# Patient Record
Sex: FEMALE | Race: WHITE | HISPANIC OR LATINO | Employment: FULL TIME | URBAN - METROPOLITAN AREA
[De-identification: names, ages, dates, MRNs, and addresses within clinical notes are randomized per-mention and may not be internally consistent; named-entity substitution may affect disease eponyms.]

---

## 2024-01-08 ENCOUNTER — APPOINTMENT (OUTPATIENT)
Dept: GENERAL RADIOLOGY | Facility: CLINIC | Age: 29
End: 2024-01-08
Attending: EMERGENCY MEDICINE
Payer: COMMERCIAL

## 2024-01-08 ENCOUNTER — HOSPITAL ENCOUNTER (EMERGENCY)
Facility: CLINIC | Age: 29
Discharge: HOME | End: 2024-01-08
Attending: EMERGENCY MEDICINE | Admitting: EMERGENCY MEDICINE
Payer: COMMERCIAL

## 2024-01-08 ENCOUNTER — APPOINTMENT (OUTPATIENT)
Dept: ULTRASOUND IMAGING | Facility: CLINIC | Age: 29
End: 2024-01-08
Attending: EMERGENCY MEDICINE
Payer: COMMERCIAL

## 2024-01-08 VITALS
HEART RATE: 94 BPM | WEIGHT: 160 LBS | OXYGEN SATURATION: 99 % | SYSTOLIC BLOOD PRESSURE: 103 MMHG | TEMPERATURE: 98.9 F | DIASTOLIC BLOOD PRESSURE: 65 MMHG | RESPIRATION RATE: 22 BRPM

## 2024-01-08 DIAGNOSIS — J40 BRONCHITIS: ICD-10-CM

## 2024-01-08 DIAGNOSIS — Z33.1 PREGNANT STATE, INCIDENTAL: ICD-10-CM

## 2024-01-08 LAB
ANION GAP SERPL CALCULATED.3IONS-SCNC: 13 MMOL/L (ref 7–15)
BASOPHILS # BLD AUTO: 0 10E3/UL (ref 0–0.2)
BASOPHILS NFR BLD AUTO: 0 %
BUN SERPL-MCNC: 6.1 MG/DL (ref 6–20)
CALCIUM SERPL-MCNC: 9.7 MG/DL (ref 8.6–10)
CHLORIDE SERPL-SCNC: 99 MMOL/L (ref 98–107)
CREAT SERPL-MCNC: 0.49 MG/DL (ref 0.51–0.95)
D DIMER PPP FEU-MCNC: 0.88 UG/ML FEU (ref 0–0.5)
DEPRECATED HCO3 PLAS-SCNC: 22 MMOL/L (ref 22–29)
EGFRCR SERPLBLD CKD-EPI 2021: >90 ML/MIN/1.73M2
EOSINOPHIL # BLD AUTO: 0.2 10E3/UL (ref 0–0.7)
EOSINOPHIL NFR BLD AUTO: 2 %
ERYTHROCYTE [DISTWIDTH] IN BLOOD BY AUTOMATED COUNT: 12.6 % (ref 10–15)
FLUAV RNA SPEC QL NAA+PROBE: NEGATIVE
FLUBV RNA RESP QL NAA+PROBE: NEGATIVE
GLUCOSE SERPL-MCNC: 101 MG/DL (ref 70–99)
HCT VFR BLD AUTO: 38.6 % (ref 35–47)
HGB BLD-MCNC: 13 G/DL (ref 11.7–15.7)
HOLD SPECIMEN: NORMAL
HOLD SPECIMEN: NORMAL
IMM GRANULOCYTES # BLD: 0.1 10E3/UL
IMM GRANULOCYTES NFR BLD: 1 %
INR PPP: 0.99 (ref 0.85–1.15)
LYMPHOCYTES # BLD AUTO: 1.9 10E3/UL (ref 0.8–5.3)
LYMPHOCYTES NFR BLD AUTO: 19 %
MCH RBC QN AUTO: 29.7 PG (ref 26.5–33)
MCHC RBC AUTO-ENTMCNC: 33.7 G/DL (ref 31.5–36.5)
MCV RBC AUTO: 88 FL (ref 78–100)
MONOCYTES # BLD AUTO: 0.7 10E3/UL (ref 0–1.3)
MONOCYTES NFR BLD AUTO: 7 %
NEUTROPHILS # BLD AUTO: 7.2 10E3/UL (ref 1.6–8.3)
NEUTROPHILS NFR BLD AUTO: 71 %
NRBC # BLD AUTO: 0 10E3/UL
NRBC BLD AUTO-RTO: 0 /100
PLATELET # BLD AUTO: 265 10E3/UL (ref 150–450)
POTASSIUM SERPL-SCNC: 3.9 MMOL/L (ref 3.4–5.3)
RBC # BLD AUTO: 4.38 10E6/UL (ref 3.8–5.2)
RSV RNA SPEC NAA+PROBE: NEGATIVE
SARS-COV-2 RNA RESP QL NAA+PROBE: NEGATIVE
SODIUM SERPL-SCNC: 134 MMOL/L (ref 135–145)
TROPONIN T SERPL HS-MCNC: <6 NG/L
WBC # BLD AUTO: 10 10E3/UL (ref 4–11)

## 2024-01-08 PROCEDURE — 71046 X-RAY EXAM CHEST 2 VIEWS: CPT

## 2024-01-08 PROCEDURE — 85025 COMPLETE CBC W/AUTO DIFF WBC: CPT | Performed by: EMERGENCY MEDICINE

## 2024-01-08 PROCEDURE — 85379 FIBRIN DEGRADATION QUANT: CPT | Performed by: EMERGENCY MEDICINE

## 2024-01-08 PROCEDURE — 87637 SARSCOV2&INF A&B&RSV AMP PRB: CPT | Performed by: EMERGENCY MEDICINE

## 2024-01-08 PROCEDURE — 85610 PROTHROMBIN TIME: CPT | Performed by: EMERGENCY MEDICINE

## 2024-01-08 PROCEDURE — 93005 ELECTROCARDIOGRAM TRACING: CPT

## 2024-01-08 PROCEDURE — 36415 COLL VENOUS BLD VENIPUNCTURE: CPT | Performed by: EMERGENCY MEDICINE

## 2024-01-08 PROCEDURE — 80048 BASIC METABOLIC PNL TOTAL CA: CPT | Performed by: EMERGENCY MEDICINE

## 2024-01-08 PROCEDURE — 94640 AIRWAY INHALATION TREATMENT: CPT

## 2024-01-08 PROCEDURE — 96361 HYDRATE IV INFUSION ADD-ON: CPT

## 2024-01-08 PROCEDURE — 84484 ASSAY OF TROPONIN QUANT: CPT | Performed by: EMERGENCY MEDICINE

## 2024-01-08 PROCEDURE — 99285 EMERGENCY DEPT VISIT HI MDM: CPT | Mod: 25

## 2024-01-08 PROCEDURE — 250N000013 HC RX MED GY IP 250 OP 250 PS 637: Performed by: EMERGENCY MEDICINE

## 2024-01-08 PROCEDURE — 76801 OB US < 14 WKS SINGLE FETUS: CPT

## 2024-01-08 PROCEDURE — 96360 HYDRATION IV INFUSION INIT: CPT

## 2024-01-08 PROCEDURE — 258N000003 HC RX IP 258 OP 636: Performed by: EMERGENCY MEDICINE

## 2024-01-08 PROCEDURE — 250N000009 HC RX 250: Performed by: EMERGENCY MEDICINE

## 2024-01-08 PROCEDURE — 93970 EXTREMITY STUDY: CPT

## 2024-01-08 RX ORDER — IPRATROPIUM BROMIDE AND ALBUTEROL SULFATE 2.5; .5 MG/3ML; MG/3ML
3 SOLUTION RESPIRATORY (INHALATION) ONCE
Status: COMPLETED | OUTPATIENT
Start: 2024-01-08 | End: 2024-01-08

## 2024-01-08 RX ORDER — ALBUTEROL SULFATE 90 UG/1
2 AEROSOL, METERED RESPIRATORY (INHALATION) ONCE
Status: COMPLETED | OUTPATIENT
Start: 2024-01-08 | End: 2024-01-08

## 2024-01-08 RX ADMIN — ALBUTEROL SULFATE 2 PUFF: 90 AEROSOL, METERED RESPIRATORY (INHALATION) at 19:28

## 2024-01-08 RX ADMIN — SODIUM CHLORIDE 1000 ML: 9 INJECTION, SOLUTION INTRAVENOUS at 14:53

## 2024-01-08 RX ADMIN — IPRATROPIUM BROMIDE AND ALBUTEROL SULFATE 3 ML: .5; 3 SOLUTION RESPIRATORY (INHALATION) at 15:26

## 2024-01-08 ASSESSMENT — ACTIVITIES OF DAILY LIVING (ADL)
ADLS_ACUITY_SCORE: 35

## 2024-01-08 NOTE — ED PROVIDER NOTES
History     Chief Complaint:  Cold Symptoms     The history is provided by the patient.      Sridevi Self is a 28 year old female 14w0d primigravida who presents with her  for evaluation of cold symptoms. Sridevi reports she developed a cough 4 mornings ago that worsened throughout that day and the next. She endorses a deep and painful cough, rhinitis, rhinorrhea, and shortness of breath. She presented to Urgent Care 2 days ago with a negative Covid test, though she notes her symptoms are consistent with her previous Covid infection in October 2022. She denies fevers, nausea, vomiting, or bowel, bladder, or vaginal symptoms. No pulmonary, clotting, or cardiac history. Sridevi notes [place] and believes she may have been exposed to an illness despite wearing a mask regularly.     Independent Historian:   None - Patient Only    Review of External Notes:   I reviewed her Urgent Care note form 1/6/24 for evaluation of a cough. Covid molecular testing was negative.    Medications:    Magnesium oxide  Riboflavin  Prenatal    Past Medical History:    ANA PAULA  Migraine  Nonalcoholic fatty liver disease  Dysmenorrhea  Hyperlipidemia  MRSA    Past Surgical History:    [place] teeth extraction  Root canal therapy x2    Physical Exam   Patient Vitals for the past 24 hrs:   BP Temp Temp src Pulse Resp SpO2 Weight   01/08/24 2034 103/65 -- -- 94 -- 99 % --   01/08/24 2004 104/64 -- -- 90 -- 99 % --   01/08/24 1806 -- -- -- 92 -- 94 % --   01/08/24 1805 -- -- -- 98 -- 97 % --   01/08/24 1804 -- -- -- -- -- 95 % --   01/08/24 1803 -- -- -- 111 -- 95 % --   01/08/24 1802 -- -- -- 93 -- 95 % --   01/08/24 1801 -- -- -- 93 -- 95 % --   01/08/24 1800 95/61 -- -- 90 -- 95 % --   01/08/24 1600 -- -- -- 106 -- 90 % --   01/08/24 1559 125/84 -- -- 106 -- 96 % --   01/08/24 1542 -- -- -- 116 -- 99 % --   01/08/24 1532 -- -- -- 106 -- 94 % --   01/08/24 1525 116/76 98.9  F (37.2  C) Temporal 105 22  97 % 72.6 kg (160 lb)   01/08/24 1453 118/81 -- -- 114 -- 95 % --   01/08/24 1445 118/81 -- -- (!) 121 21 95 % --   01/08/24 1436 112/83 -- -- (!) 125 22 97 % --   01/08/24 1428 (!) 136/92 97.7  F (36.5  C) Temporal (!) 152 24 96 % --      Physical Exam  General:              Well-nourished              Speaking in full sentences  Eyes:              Conjunctiva without injection or scleral icterus  ENT:              Moist mucous membranes              Nares patent              Pinnae normal  Neck:              Full ROM              No stiffness appreciated  Resp:              Coarseness to right mid-lung field              Left lung field  CV:                    Tachycardic rate, regular rhythm              S1 and S2 present              No murmur, gallop or rub  GI:              BS present              Abdomen soft without distention              Non-tender to light and deep palpation              No guarding or rebound tenderness  Skin:              Warm, dry, well perfused              No rashes or open wounds on exposed skin  MSK:              Moves all extremities              No focal deformities or swelling  Neuro:              Alert              Answers questions appropriately              Moves all extremities equally              Gait stable  Psych:              Normal affect, normal mood    Emergency Department Course   ECG  ECG taken at 1458, ECG read at 1504  Sinus tachycardia  Otherwise normal ECG   Rate 114 bpm. HI interval 126 ms. QRS duration 66 ms. QT/QTc 304/419 ms. P-R-T axes 63 30 15.     Imaging:  US Lower Extremity Venous Duplex Bilateral   Final Result   IMPRESSION:   1.  No deep venous thrombosis in the bilateral lower extremities.      Chest XR,  PA & LAT   Final Result   IMPRESSION: No acute cardiopulmonary disease.      EDUAR PHILLIP MD            SYSTEM ID:  [id number]      POC US OB TRANSABDOMINAL LIMITED   Final Result     Limited Obstetric Ultrasound      Procedure Note: POC Ultrasound  Limited Obstetric Exam      (This is a limited bedside US which does not assess fetal health and is not a formal OB ultrasound)       Indication: Cough, pregnant state      Views: Suprapubic transverse and longitudinal       Findings: Intrauterine pregnancy and Fetal cardiac activity      Impression: Intrauterine pregnancy  and Fetal cardiac activity present       Study performed by: Axel Davidson MD       Images archived: Yes            Report per radiology    Laboratory:  Labs Ordered and Resulted from Time of ED Arrival to Time of ED Departure   BASIC METABOLIC PANEL - Abnormal       Result Value    Sodium 134 (*)     Potassium 3.9      Chloride 99      Carbon Dioxide (CO2) 22      Anion Gap 13      Urea Nitrogen 6.1      Creatinine 0.49 (*)     GFR Estimate >90      Calcium 9.7      Glucose 101 (*)    D DIMER QUANTITATIVE - Abnormal    D-Dimer Quantitative 0.88 (*)    INFLUENZA A/B, RSV, & SARS-COV2 PCR - Normal    Influenza A PCR Negative      Influenza B PCR Negative      RSV PCR Negative      SARS CoV2 PCR Negative     TROPONIN T, HIGH SENSITIVITY - Normal    Troponin T, High Sensitivity <6     INR - Normal    INR 0.99     CBC WITH PLATELETS AND DIFFERENTIAL    WBC Count 10.0      RBC Count 4.38      Hemoglobin 13.0      Hematocrit 38.6      MCV 88      MCH 29.7      MCHC 33.7      RDW 12.6      Platelet Count 265      % Neutrophils 71      % Lymphocytes 19      % Monocytes 7      % Eosinophils 2      % Basophils 0      % Immature Granulocytes 1      NRBCs per 100 WBC 0      Absolute Neutrophils 7.2      Absolute Lymphocytes 1.9      Absolute Monocytes 0.7      Absolute Eosinophils 0.2      Absolute Basophils 0.0      Absolute Immature Granulocytes 0.1      Absolute NRBCs 0.0       Procedures   None    Emergency Department Course & Assessments:    Interventions:  Medications   ipratropium - albuterol 0.5 mg/2.5 mg/3 mL (DUONEB) neb solution 3 mL (3 mLs Nebulization $Given 1/8/24 2439)   sodium chloride  0.9% BOLUS 1,000 mL (0 mLs Intravenous Stopped 24 180)   albuterol (PROVENTIL HFA/VENTOLIN HFA) inhaler (2 puffs Inhalation $Given 24)     Assessments:  1438 I obtained history and examined the patient as noted above.  1458 I performed a bedside US.  1540 I rechecked the patient and explained findings.    I rechecked the patient.   I rechecked the patient and explained findings. Discussed risks/benefits of further imaging with CT, and patient preferring discharge and close monitoring.  Patient discharged home with instructions regarding supportive care, medications, and reasons to return. The importance of close follow-up was reviewed.     Independent Interpretation (X-rays, CTs, rhythm strip):  I reviewed her chest XR, which shows no acute infiltrate.    Consultations/Discussion of Management or Tests:  None    Social Determinants of Health affecting care:   None    Disposition:  The patient was discharged to home.     Impression & Plan    CMS Diagnoses: None    Medical Decision Making:  Sridevi Self is a 28 yr old  at 14 wk EGA presenting to the ER for evaluation of cold symptoms.  VS on presentation notable for tachycardia with HR of 152, which improved and ultimately normalized during her ED course.  Based on the above history and evaluation, high suspicion for bronchitis given symptom complex of nasal congestion, rhinorrhea, and cough.  During her ED course, she was noted to have intermittent episodes and bouts of frequent coughing.  Using shared decision-making, we did perform a chest x-ray, which was negative for acute infiltrate.  Viral testing returned negative for COVID, influenza, or RSV.  She initially had coarseness to the right mid lung field for which a nebulizer treatment was administered.  On reassessment, this had resolved, indicating a possible component of bronchospasm.  She noted feeling somewhat jittery, likely secondary to the beta agonist effect.  She was  additionally provided IV fluids, and heart rate improved in conjunction with this.  Per chart review, patient has been tachycardic at many of her recent prior visits, suggesting this may be near her baseline.  EKG was obtained demonstrating sinus tachycardia, though without evidence of prolonged QT interval, WPW, or Brugada syndrome.  We had a long and thorough discussion regarding the above clinical impression as well as broad differential diagnosis.  Given duration of symptoms (4 days), do not feel patient requires systemic antibiotic therapy, but we otherwise discussed supportive cares including rest, fluids, acetaminophen as needed and close monitoring of symptoms.  We also discussed at length consideration of pulmonary embolism as patient does acknowledge a component of shortness of breath, and presented with tachycardia.  She does have risk factors including current pregnancy as well as antecedent travel a few months previous.  We discussed my low pretest probability given her above symptom complex, the patient wishing to proceed with further restratification including D-dimer.  This did return elevated at 0.88.  As such, duplex ultrasound was subsequently performed of the bilateral lower extremities, fortunately revealing no evidence of DVT.  We had a long and thorough discussion regarding risks/benefits of further evaluation for pulmonary embolism, which does involve CT of the chest.  We discussed risks including radiation exposure both the patient as well as to the developing fetus, also acknowledging the early gestational age.  We also discussed YEARS criteria, which given current D-dimer value, strongly argues against PE.  Ultimately, patient is electing to forego CT imaging at this time, which I do feel to be reasonable given the above workup and very low pretest probability given her above symptom complex.  We discussed need to monitor symptoms closely and ongoing supportive cares.  I recommended close  follow-up with primary care provider in 2 days for recheck.  She is to return to the ER in the meantime with any new or troubling symptoms including worsened shortness of breath, worsening chest pain, syncope or any other concerns.  We did perform a bedside ultrasound, confirming IUP with positive fetal heart rate.  Patient and spouse both feel very comfortable with outlined plan of care.  All other questions have been answered prior to discharge.    Diagnosis:    ICD-10-CM    1. Bronchitis  J40       2. Pregnant state, incidental  Z33.1           Scribe Disclosure:  Tim MARCOS Hailie, am serving as a scribe at 7:13 PM on 1/8/2024 to document services personally performed by Axel Davidson MD based on my observations and the provider's statements to me.   1/8/2024   Axel Davidson MD Roach, Brian Donald, MD  01/08/24 [id number]

## 2024-01-08 NOTE — ED TRIAGE NOTES
Cold symptoms x 2 days. In triage, heart rate is 152. Patient is short of breath with activity. 14 weeks pregnant.

## 2024-01-09 LAB
ATRIAL RATE - MUSE: 114 BPM
DIASTOLIC BLOOD PRESSURE - MUSE: NORMAL MMHG
INTERPRETATION ECG - MUSE: NORMAL
P AXIS - MUSE: 63 DEGREES
PR INTERVAL - MUSE: 126 MS
QRS DURATION - MUSE: 66 MS
QT - MUSE: 304 MS
QTC - MUSE: 419 MS
R AXIS - MUSE: 30 DEGREES
SYSTOLIC BLOOD PRESSURE - MUSE: NORMAL MMHG
T AXIS - MUSE: 15 DEGREES
VENTRICULAR RATE- MUSE: 114 BPM

## 2024-01-09 NOTE — DISCHARGE INSTRUCTIONS
Please monitor symptoms closely.  Rest, fluids, and symptoms should improve with supportive cares.    Follow-up with primary care provider in 1 to 2 days for recheck.    Return to ER with any new or troubling symptoms such as worsening cough, shortness of breath, chest pain or any other concerns.    Discharge Instructions  Bronchitis, Pneumonia, Bronchospasm    You were seen today for a chest infection or inflammation. If your provider decided this was due to a bacterial infection, you may need an antibiotic. Sometimes these are caused by a virus, and then an antibiotic will not help.     Generally, every Emergency Department visit should have a follow-up clinic visit with either a primary or a specialty clinic/provider. Please follow-up as instructed by your emergency provider today.    Return to the Emergency Department if:  Your breathing gets much worse.  You are very weak, or feel much more ill.  You develop new symptoms, such as chest pain.  You cough up blood.  You are vomiting (throwing up) enough that you cannot keep fluids or your medicine down.    What can I do to help myself?  Fill any prescriptions the provider gave you and take them right away--especially antibiotics. Be sure to finish the whole antibiotic prescription.  You may be given a prescription for an inhaler, which can help loosen tight air passages.  Use this as needed, but not more often than directed. Inhalers work much better when used with a spacer.   You may be given a prescription for a steroid to reduce inflammation. Used long-term, these can have side effects, but for short-term use they are safe. You may notice restlessness or increased appetite.      You may use non-prescription cough or cold medicines. Cough medicines may help, but don t make the cough go away completely.   Avoid smoke, because this can make your symptoms worse. If you smoke, this may be a good time to quit! Consider using nicotine lozenges, gum, or patches to  reduce cravings.   If you have a fever, Tylenol  (acetaminophen), Motrin  (ibuprofen), or Advil  (ibuprofen) may help bring fever down and may help you feel more comfortable. Be sure to read and follow the package directions, and ask your provider if you have questions.  Be sure to get your flu shot each year.  For certain ages, the pneumonia shot can help prevent pneumonia.  If you were given a prescription for medicine here today, be sure to read all of the information (including the package insert) that comes with your prescription.  This will include important information about the medicine, its side effects, and any warnings that you need to know about.  The pharmacist who fills the prescription can provide more information and answer questions you may have about the medicine.  If you have questions or concerns that the pharmacist cannot address, please call or return to the Emergency Department.     Remember that you can always come back to the Emergency Department if you are not able to see your regular provider in the amount of time listed above, if you get any new symptoms, or if there is anything that worries you.

## 2024-03-10 ENCOUNTER — HOSPITAL ENCOUNTER (OUTPATIENT)
Facility: CLINIC | Age: 29
Discharge: HOME | End: 2024-03-10
Attending: OBSTETRICS & GYNECOLOGY | Admitting: OBSTETRICS & GYNECOLOGY
Payer: COMMERCIAL

## 2024-03-10 ENCOUNTER — HOSPITAL ENCOUNTER (OUTPATIENT)
Facility: CLINIC | Age: 29
End: 2024-03-10
Admitting: OBSTETRICS & GYNECOLOGY
Payer: COMMERCIAL

## 2024-03-10 VITALS
SYSTOLIC BLOOD PRESSURE: 108 MMHG | HEIGHT: 61 IN | DIASTOLIC BLOOD PRESSURE: 58 MMHG | TEMPERATURE: 98 F | RESPIRATION RATE: 18 BRPM | WEIGHT: 176 LBS | HEART RATE: 96 BPM | BODY MASS INDEX: 33.23 KG/M2

## 2024-03-10 PROBLEM — Z36.89 ENCOUNTER FOR TRIAGE IN PREGNANT PATIENT: Status: ACTIVE | Noted: 2024-03-10

## 2024-03-10 LAB
ALBUMIN UR-MCNC: NEGATIVE MG/DL
APPEARANCE UR: CLEAR
BACTERIA #/AREA URNS HPF: ABNORMAL /HPF
BILIRUB UR QL STRIP: NEGATIVE
COLOR UR AUTO: ABNORMAL
GLUCOSE UR STRIP-MCNC: NEGATIVE MG/DL
HGB UR QL STRIP: NEGATIVE
KETONES UR STRIP-MCNC: NEGATIVE MG/DL
LEUKOCYTE ESTERASE UR QL STRIP: NEGATIVE
NITRATE UR QL: NEGATIVE
PH UR STRIP: 6.5 [PH] (ref 5–7)
RBC URINE: 0 /HPF
SP GR UR STRIP: 1 (ref 1–1.03)
SQUAMOUS EPITHELIAL: <1 /HPF
UROBILINOGEN UR STRIP-MCNC: NORMAL MG/DL
WBC URINE: <1 /HPF

## 2024-03-10 PROCEDURE — G0463 HOSPITAL OUTPT CLINIC VISIT: HCPCS

## 2024-03-10 PROCEDURE — 81001 URINALYSIS AUTO W/SCOPE: CPT | Performed by: OBSTETRICS & GYNECOLOGY

## 2024-03-10 RX ORDER — FLUTICASONE PROPIONATE 50 MCG
1 SPRAY, SUSPENSION (ML) NASAL DAILY
Status: ON HOLD | COMMUNITY
End: 2024-07-15

## 2024-03-10 RX ORDER — LIDOCAINE 40 MG/G
CREAM TOPICAL
Status: DISCONTINUED | OUTPATIENT
Start: 2024-03-10 | End: 2024-03-10 | Stop reason: HOSPADM

## 2024-03-10 RX ORDER — BACLOFEN 20 MG
500 TABLET ORAL DAILY
COMMUNITY

## 2024-03-10 ASSESSMENT — ACTIVITIES OF DAILY LIVING (ADL)
ADLS_ACUITY_SCORE: 35
ADLS_ACUITY_SCORE: 18

## 2024-03-10 NOTE — PLAN OF CARE
Data: Patient presented to [facility]: 3/10/2024 11:59 AM.  Reason for maternal/fetal assessment is abdominal pain. Patient reports achy left lower abdominal pain and a sharp pain in their right lower quadrant.  Patient is a .  Prenatal record reviewed. Pregnancy has been uncomplicated..  Gestational Age Unknown. VSS. Fetal movement active. Patient denies uterine contractions, leaking of vaginal fluid/rupture of membranes, vaginal bleeding, pelvic pressure, nausea, vomiting, headache, visual disturbances, epigastric or URQ pain, significant edema. Support person is present.   Action: Verbal consent for EFM. Triage assessment completed. Bill of rights reviewed.  Response: Patient verbalized agreement with plan. Will contact Dr Stacey Herbert with update and for further orders.

## 2024-03-10 NOTE — PROVIDER NOTIFICATION
03/10/24 1241   Provider Notification   Provider Name/Title Piedad   Method of Notification Electronic Page   Request Evaluate - Remote   Notification Reason Patient Arrived;Pain     Triage patient in [facility] 1 S.M. , 22w6d, no significant medical hx. Here because she was holding her friends baby and tried to get up while holding the baby and pulled a ligament. Complaining of 4/10 aching pain in her left lower abdomen and a sharp pain in her right lower quadrant. Has not taken any medication for the pain. Dop tones found, basline 150. No contractions seen on monitor. No complaints of bleeding, contractions, ruptured membranes. Vitals and assessment WNL. No other complaints.  
   03/10/24 1339   Provider Notification   Provider Name/Title Piedad   Method of Notification Electronic Page   Request Evaluate - Remote   Notification Reason Lab/Diagnostic Study     UA results negative.  
   03/10/24 3025   Provider Notification   Provider Name/Title Dr. Herbert   Method of Notification Electronic Page   Request Evaluate - Remote   Notification Reason Other (Comment)     MD messaged again to ask if they wanted to run any other tests on this patient.    Verbal orders received to get a UA on patient and if normal she can go home. Educate patient that tylenol is safe during pregnancy.  
36.8

## 2024-03-10 NOTE — PLAN OF CARE
Data: Patient assessed in the [facility] for abdominal pain.  Cervical exam not examined.  Membranes intact.  Contractions/uterine assessment  no contractions.  Action:  Presumed adequate fetal oxygenation documented (see flow record). Discharge instructions reviewed.  Patient instructed to report change in fetal movement, vaginal leaking of fluid or bleeding, abdominal pain, or any concerns related to the pregnancy to her nurse/physician.    Response: Orders to discharge home per Stacey Herbert.  Patient verbalized understanding of education and verbalized agreement with plan. Discharged to home at 1350.

## 2024-05-12 ENCOUNTER — HEALTH MAINTENANCE LETTER (OUTPATIENT)
Age: 29
End: 2024-05-12

## 2024-07-01 ENCOUNTER — HOSPITAL ENCOUNTER (OUTPATIENT)
Facility: CLINIC | Age: 29
Discharge: HOME | End: 2024-07-01
Attending: OBSTETRICS & GYNECOLOGY | Admitting: OBSTETRICS & GYNECOLOGY
Payer: COMMERCIAL

## 2024-07-01 VITALS
OXYGEN SATURATION: 100 % | DIASTOLIC BLOOD PRESSURE: 69 MMHG | TEMPERATURE: 98.4 F | RESPIRATION RATE: 18 BRPM | SYSTOLIC BLOOD PRESSURE: 113 MMHG

## 2024-07-01 DIAGNOSIS — O23.599 BACTERIAL VAGINOSIS IN PREGNANCY: Primary | ICD-10-CM

## 2024-07-01 DIAGNOSIS — B96.89 BACTERIAL VAGINOSIS IN PREGNANCY: Primary | ICD-10-CM

## 2024-07-01 LAB
ALBUMIN UR-MCNC: NEGATIVE MG/DL
APPEARANCE UR: CLEAR
BACTERIA #/AREA URNS HPF: ABNORMAL /HPF
BILIRUB UR QL STRIP: NEGATIVE
CLUE CELLS: PRESENT
COLOR UR AUTO: ABNORMAL
CRYSTALS AMN MICRO: NORMAL
GLUCOSE UR STRIP-MCNC: NEGATIVE MG/DL
HGB UR QL STRIP: NEGATIVE
KETONES UR STRIP-MCNC: NEGATIVE MG/DL
LEUKOCYTE ESTERASE UR QL STRIP: NEGATIVE
MUCOUS THREADS #/AREA URNS LPF: PRESENT /LPF
NITRATE UR QL: NEGATIVE
PH UR STRIP: 6.5 [PH] (ref 5–7)
RBC URINE: <1 /HPF
SP GR UR STRIP: 1.01 (ref 1–1.03)
SQUAMOUS EPITHELIAL: 1 /HPF
TRICHOMONAS, WET PREP: ABNORMAL
UROBILINOGEN UR STRIP-MCNC: NORMAL MG/DL
WBC URINE: 2 /HPF
WBC'S/HIGH POWER FIELD, WET PREP: ABNORMAL
YEAST, WET PREP: ABNORMAL

## 2024-07-01 PROCEDURE — G0463 HOSPITAL OUTPT CLINIC VISIT: HCPCS

## 2024-07-01 PROCEDURE — 87210 SMEAR WET MOUNT SALINE/INK: CPT | Performed by: OBSTETRICS & GYNECOLOGY

## 2024-07-01 PROCEDURE — 81003 URINALYSIS AUTO W/O SCOPE: CPT | Performed by: OBSTETRICS & GYNECOLOGY

## 2024-07-01 RX ORDER — METRONIDAZOLE 500 MG/1
500 TABLET ORAL 2 TIMES DAILY
Qty: 14 TABLET | Refills: 0 | Status: SHIPPED | OUTPATIENT
Start: 2024-07-01 | End: 2024-07-08

## 2024-07-01 RX ORDER — LIDOCAINE 40 MG/G
CREAM TOPICAL
Status: DISCONTINUED | OUTPATIENT
Start: 2024-07-01 | End: 2024-07-01 | Stop reason: HOSPADM

## 2024-07-01 ASSESSMENT — ACTIVITIES OF DAILY LIVING (ADL)
ADLS_ACUITY_SCORE: 35

## 2024-07-01 NOTE — PROVIDER NOTIFICATION
.Data: Patient assessed in the [facility] for leaking vaginal fluid.  Cervical exam not examined but visualized cervix with speculum, cervix closed.  Membranes intact.  Contractions/uterine assessment present, 2-6 mins palpating mild pt unaware of contractions.  Action:  Presumed adequate fetal oxygenation documented (see flow record). Discharge instructions reviewed.  Patient instructed to report change in fetal movement, vaginal leaking of fluid or bleeding, abdominal pain, or any concerns related to the pregnancy to her nurse/physician.    Response: Orders to discharge home per Christal Hobson.  Patient verbalized understanding of education and verbalized agreement with plan. Discharged to home at 0330.

## 2024-07-01 NOTE — DISCHARGE INSTRUCTIONS
Learning About When to Call Your Doctor During Pregnancy (After 20 Weeks)  Overview  It's common to have concerns about what might be a problem when you're pregnant. Most pregnancies don't have any serious problems. But it's still important to know when to call your doctor if you have certain symptoms or signs of labor.  These are general suggestions. Your doctor may give you some more information about when to call.  When to call your doctor (after 20 weeks)  Call 911  anytime you think you may need emergency care. For example, call if:  You have severe vaginal bleeding. This means you are soaking through a pad each hour for 2 or more hours.  You have sudden, severe pain in your belly.  You have chest pain, are short of breath, or cough up blood.  You passed out (lost consciousness).  You have a seizure.  You see or feel the umbilical cord.  You think you are about to deliver your baby and can't make it safely to the hospital or birthing center.  Call your doctor now or seek immediate medical care if:  You have vaginal bleeding.  You have belly pain.  You have a fever.  You are dizzy or lightheaded, or you feel like you may faint.  You have signs of a blood clot in your leg (called a deep vein thrombosis), such as:  Pain in the calf, back of the knee, thigh, or groin.  Swelling in your leg or groin.  A color change on the leg or groin. The skin may be reddish or purplish, depending on your usual skin color.  You have symptoms of preeclampsia, such as:  Sudden swelling of your face, hands, or feet.  New vision problems (such as dimness, blurring, or seeing spots).  A severe headache.  You have a sudden release of fluid from your vagina. (You think your water broke.)  You've been having regular contractions for an hour. This means that you've had at least 6 contractions within 1 hour, even after you change your position and drink fluids.  You notice that your baby has stopped moving or is moving less than  "normal.  You have signs of heart failure, such as:  New or increased shortness of breath.  New or worse swelling in your legs, ankles, or feet.  Sudden weight gain, such as more than 2 to 3 pounds in a day or 5 pounds in a week.  Feeling so tired or weak that you cannot do your usual activities.  You have symptoms of a urinary tract infection. These may include:  Pain or burning when you urinate.  A frequent need to urinate without being able to pass much urine.  Pain in the flank, which is just below the rib cage and above the waist on either side of the back.  Blood in your urine.  Watch closely for changes in your health, and be sure to contact your doctor if:  You have vaginal discharge that smells bad.  You feel sad, anxious, or hopeless for more than a few days.  You have skin changes, such as a rash, itching, or a yellow color to your skin.  You have other concerns about your pregnancy.  If you have labor signs at 37 weeks or more  If you have signs of labor at 37 weeks or more, your doctor may tell you to call when your labor becomes more active. Symptoms of active labor include:  Contractions that are regular.  Contractions that are less than 5 minutes apart.  Contractions that are hard to talk through.  Follow-up care is a key part of your treatment and safety. Be sure to make and go to all appointments, and call your doctor if you are having problems. It's also a good idea to know your test results and keep a list of the medicines you take.  Where can you learn more?  Go to https://www.[employer].net/patiented  Enter N531 in the search box to learn more about \"Learning About When to Call Your Doctor During Pregnancy (After 20 Weeks).\"  Current as of: July 10, 2023               Content Version: 14.0    2665-9606 [employer].   Care instructions adapted under license by your healthcare professional. If you have questions about a medical condition or this instruction, always ask your healthcare " professional. [employer] disclaims any warranty or liability for your use of this information.

## 2024-07-01 NOTE — PROVIDER NOTIFICATION
24 0127   Provider Notification   Provider Name/Title Dr. Hobson   Method of Notification Phone   Request Evaluate - Remote   Notification Reason Patient Arrived     Paged MD to inform of pt arrival. Pt is a  39w GBS- uncomplicated pregnancy. Pt noticed yesterday at 1630 she had a constant wet feeling and thought her water broke and has been a slow leak. Pt states there has been no fluid on her underwear but appears clear when wiping with toilet paper. Pt denies VB, contractions. Pt has had more of an urgency to urinate otherwise no other urinary symptoms. RN had pt pool, no fluid seen on the pad. Pt feeling fetal movement, FHR tracing minimal, 1 accel. In the last 10 mins x2 contractions palpating mild and pt unaware of contractions.   MD gave orders to collect fern, wet prep, UA and check cervix if indicated.

## 2024-07-01 NOTE — PROVIDER NOTIFICATION
07/01/24 0319   Provider Notification   Provider Name/Title    Method of Notification Phone   Request Evaluate - Remote   Notification Reason Lab/Diagnostic Study     Paged MD to review labs, UA normal, no ferning, positive for clue cells. MD put in an order to pt pharmacy for flagyl to be picked up in the morning. MD gave discharge orders. FHR tracing category 1 with accels, moderate variability. Pt unaware of contractins, palpating mild. Reviewed education with pt, and discharge instructions. Pt agreeable with poc.

## 2024-07-12 ENCOUNTER — HOSPITAL ENCOUNTER (INPATIENT)
Facility: CLINIC | Age: 29
LOS: 5 days | Discharge: HOME HEALTH | End: 2024-07-17
Attending: OBSTETRICS & GYNECOLOGY | Admitting: OBSTETRICS & GYNECOLOGY
Payer: COMMERCIAL

## 2024-07-12 DIAGNOSIS — E66.812 CLASS 2 OBESITY: ICD-10-CM

## 2024-07-12 DIAGNOSIS — K76.0 FATTY LIVER: ICD-10-CM

## 2024-07-12 DIAGNOSIS — Z36.89 ENCOUNTER FOR TRIAGE IN PREGNANT PATIENT: ICD-10-CM

## 2024-07-12 DIAGNOSIS — Z98.891 S/P C-SECTION: Primary | ICD-10-CM

## 2024-07-12 LAB
ABO/RH(D): NORMAL
ALBUMIN SERPL BCG-MCNC: 3.3 G/DL (ref 3.5–5.2)
ALP SERPL-CCNC: 212 U/L (ref 40–150)
ALT SERPL W P-5'-P-CCNC: 14 U/L (ref 0–50)
ANION GAP SERPL CALCULATED.3IONS-SCNC: 12 MMOL/L (ref 7–15)
ANTIBODY SCREEN: NEGATIVE
AST SERPL W P-5'-P-CCNC: 15 U/L (ref 0–45)
BASOPHILS # BLD AUTO: 0 10E3/UL (ref 0–0.2)
BASOPHILS NFR BLD AUTO: 0 %
BILIRUB SERPL-MCNC: 0.2 MG/DL
BUN SERPL-MCNC: 8 MG/DL (ref 6–20)
CALCIUM SERPL-MCNC: 8.7 MG/DL (ref 8.6–10)
CHLORIDE SERPL-SCNC: 102 MMOL/L (ref 98–107)
CREAT SERPL-MCNC: 0.56 MG/DL (ref 0.51–0.95)
DEPRECATED HCO3 PLAS-SCNC: 22 MMOL/L (ref 22–29)
EGFRCR SERPLBLD CKD-EPI 2021: >90 ML/MIN/1.73M2
EOSINOPHIL # BLD AUTO: 0.1 10E3/UL (ref 0–0.7)
EOSINOPHIL NFR BLD AUTO: 1 %
ERYTHROCYTE [DISTWIDTH] IN BLOOD BY AUTOMATED COUNT: 15.7 % (ref 10–15)
GLUCOSE SERPL-MCNC: 96 MG/DL (ref 70–99)
HCT VFR BLD AUTO: 36.6 % (ref 35–47)
HGB BLD-MCNC: 12.2 G/DL (ref 11.7–15.7)
IMM GRANULOCYTES # BLD: 0.2 10E3/UL
IMM GRANULOCYTES NFR BLD: 1 %
LYMPHOCYTES # BLD AUTO: 2.5 10E3/UL (ref 0.8–5.3)
LYMPHOCYTES NFR BLD AUTO: 19 %
MCH RBC QN AUTO: 30 PG (ref 26.5–33)
MCHC RBC AUTO-ENTMCNC: 33.3 G/DL (ref 31.5–36.5)
MCV RBC AUTO: 90 FL (ref 78–100)
MONOCYTES # BLD AUTO: 1 10E3/UL (ref 0–1.3)
MONOCYTES NFR BLD AUTO: 8 %
NEUTROPHILS # BLD AUTO: 9.2 10E3/UL (ref 1.6–8.3)
NEUTROPHILS NFR BLD AUTO: 71 %
NRBC # BLD AUTO: 0 10E3/UL
NRBC BLD AUTO-RTO: 0 /100
PLATELET # BLD AUTO: 263 10E3/UL (ref 150–450)
POTASSIUM SERPL-SCNC: 4.2 MMOL/L (ref 3.4–5.3)
PROT SERPL-MCNC: 6.2 G/DL (ref 6.4–8.3)
RBC # BLD AUTO: 4.07 10E6/UL (ref 3.8–5.2)
SODIUM SERPL-SCNC: 136 MMOL/L (ref 135–145)
SPECIMEN EXPIRATION DATE: NORMAL
WBC # BLD AUTO: 13 10E3/UL (ref 4–11)

## 2024-07-12 PROCEDURE — 250N000013 HC RX MED GY IP 250 OP 250 PS 637: Performed by: OBSTETRICS & GYNECOLOGY

## 2024-07-12 PROCEDURE — 120N000013 HC R&B IMCU

## 2024-07-12 PROCEDURE — 85004 AUTOMATED DIFF WBC COUNT: CPT | Performed by: OBSTETRICS & GYNECOLOGY

## 2024-07-12 PROCEDURE — 80053 COMPREHEN METABOLIC PANEL: CPT | Performed by: OBSTETRICS & GYNECOLOGY

## 2024-07-12 PROCEDURE — 36415 COLL VENOUS BLD VENIPUNCTURE: CPT | Performed by: OBSTETRICS & GYNECOLOGY

## 2024-07-12 PROCEDURE — 86900 BLOOD TYPING SEROLOGIC ABO: CPT | Performed by: OBSTETRICS & GYNECOLOGY

## 2024-07-12 PROCEDURE — 86780 TREPONEMA PALLIDUM: CPT | Performed by: OBSTETRICS & GYNECOLOGY

## 2024-07-12 RX ORDER — CITRIC ACID/SODIUM CITRATE 334-500MG
30 SOLUTION, ORAL ORAL ONCE
Status: DISCONTINUED | OUTPATIENT
Start: 2024-07-12 | End: 2024-07-14

## 2024-07-12 RX ORDER — OXYTOCIN 10 [USP'U]/ML
10 INJECTION, SOLUTION INTRAMUSCULAR; INTRAVENOUS
Status: DISCONTINUED | OUTPATIENT
Start: 2024-07-12 | End: 2024-07-14

## 2024-07-12 RX ORDER — PROCHLORPERAZINE MALEATE 10 MG
10 TABLET ORAL EVERY 6 HOURS PRN
Status: DISCONTINUED | OUTPATIENT
Start: 2024-07-12 | End: 2024-07-14

## 2024-07-12 RX ORDER — CITRIC ACID/SODIUM CITRATE 334-500MG
30 SOLUTION, ORAL ORAL
Status: COMPLETED | OUTPATIENT
Start: 2024-07-12 | End: 2024-07-14

## 2024-07-12 RX ORDER — ONDANSETRON 2 MG/ML
4 INJECTION INTRAMUSCULAR; INTRAVENOUS EVERY 6 HOURS PRN
Status: DISCONTINUED | OUTPATIENT
Start: 2024-07-12 | End: 2024-07-14

## 2024-07-12 RX ORDER — METOCLOPRAMIDE HYDROCHLORIDE 5 MG/ML
10 INJECTION INTRAMUSCULAR; INTRAVENOUS EVERY 6 HOURS PRN
Status: DISCONTINUED | OUTPATIENT
Start: 2024-07-12 | End: 2024-07-14

## 2024-07-12 RX ORDER — METHYLERGONOVINE MALEATE 0.2 MG/ML
200 INJECTION INTRAVENOUS
Status: DISCONTINUED | OUTPATIENT
Start: 2024-07-12 | End: 2024-07-14

## 2024-07-12 RX ORDER — PROCHLORPERAZINE 25 MG
25 SUPPOSITORY, RECTAL RECTAL EVERY 12 HOURS PRN
Status: DISCONTINUED | OUTPATIENT
Start: 2024-07-12 | End: 2024-07-14

## 2024-07-12 RX ORDER — LIDOCAINE HYDROCHLORIDE 20 MG/ML
JELLY TOPICAL EVERY 4 HOURS PRN
Status: DISCONTINUED | OUTPATIENT
Start: 2024-07-12 | End: 2024-07-14

## 2024-07-12 RX ORDER — OXYTOCIN/0.9 % SODIUM CHLORIDE 30/500 ML
100-340 PLASTIC BAG, INJECTION (ML) INTRAVENOUS CONTINUOUS PRN
Status: DISCONTINUED | OUTPATIENT
Start: 2024-07-12 | End: 2024-07-14

## 2024-07-12 RX ORDER — NALOXONE HYDROCHLORIDE 0.4 MG/ML
0.4 INJECTION, SOLUTION INTRAMUSCULAR; INTRAVENOUS; SUBCUTANEOUS
Status: DISCONTINUED | OUTPATIENT
Start: 2024-07-12 | End: 2024-07-14

## 2024-07-12 RX ORDER — LOPERAMIDE HCL 2 MG
4 CAPSULE ORAL
Status: DISCONTINUED | OUTPATIENT
Start: 2024-07-12 | End: 2024-07-14

## 2024-07-12 RX ORDER — KETOROLAC TROMETHAMINE 30 MG/ML
30 INJECTION, SOLUTION INTRAMUSCULAR; INTRAVENOUS
Status: DISCONTINUED | OUTPATIENT
Start: 2024-07-12 | End: 2024-07-14

## 2024-07-12 RX ORDER — SODIUM CHLORIDE, SODIUM LACTATE, POTASSIUM CHLORIDE, CALCIUM CHLORIDE 600; 310; 30; 20 MG/100ML; MG/100ML; MG/100ML; MG/100ML
INJECTION, SOLUTION INTRAVENOUS CONTINUOUS
Status: DISCONTINUED | OUTPATIENT
Start: 2024-07-12 | End: 2024-07-14

## 2024-07-12 RX ORDER — TERBUTALINE SULFATE 1 MG/ML
0.25 INJECTION, SOLUTION SUBCUTANEOUS
Status: DISCONTINUED | OUTPATIENT
Start: 2024-07-12 | End: 2024-07-14

## 2024-07-12 RX ORDER — MISOPROSTOL 200 UG/1
400 TABLET ORAL
Status: DISCONTINUED | OUTPATIENT
Start: 2024-07-12 | End: 2024-07-14

## 2024-07-12 RX ORDER — ACETAMINOPHEN 325 MG/1
650 TABLET ORAL EVERY 4 HOURS PRN
Status: DISCONTINUED | OUTPATIENT
Start: 2024-07-12 | End: 2024-07-14

## 2024-07-12 RX ORDER — ONDANSETRON 4 MG/1
4 TABLET, ORALLY DISINTEGRATING ORAL EVERY 6 HOURS PRN
Status: DISCONTINUED | OUTPATIENT
Start: 2024-07-12 | End: 2024-07-14

## 2024-07-12 RX ORDER — TRANEXAMIC ACID 10 MG/ML
1 INJECTION, SOLUTION INTRAVENOUS EVERY 30 MIN PRN
Status: DISCONTINUED | OUTPATIENT
Start: 2024-07-12 | End: 2024-07-14

## 2024-07-12 RX ORDER — NALOXONE HYDROCHLORIDE 0.4 MG/ML
0.2 INJECTION, SOLUTION INTRAMUSCULAR; INTRAVENOUS; SUBCUTANEOUS
Status: DISCONTINUED | OUTPATIENT
Start: 2024-07-12 | End: 2024-07-14

## 2024-07-12 RX ORDER — MORPHINE SULFATE 10 MG/ML
15 INJECTION, SOLUTION INTRAMUSCULAR; INTRAVENOUS
Status: COMPLETED | OUTPATIENT
Start: 2024-07-12 | End: 2024-07-13

## 2024-07-12 RX ORDER — CARBOPROST TROMETHAMINE 250 UG/ML
250 INJECTION, SOLUTION INTRAMUSCULAR
Status: DISCONTINUED | OUTPATIENT
Start: 2024-07-12 | End: 2024-07-14

## 2024-07-12 RX ORDER — METOCLOPRAMIDE 10 MG/1
10 TABLET ORAL EVERY 6 HOURS PRN
Status: DISCONTINUED | OUTPATIENT
Start: 2024-07-12 | End: 2024-07-14

## 2024-07-12 RX ORDER — OXYTOCIN/0.9 % SODIUM CHLORIDE 30/500 ML
340 PLASTIC BAG, INJECTION (ML) INTRAVENOUS CONTINUOUS PRN
Status: DISCONTINUED | OUTPATIENT
Start: 2024-07-12 | End: 2024-07-14

## 2024-07-12 RX ORDER — MISOPROSTOL 200 UG/1
800 TABLET ORAL
Status: DISCONTINUED | OUTPATIENT
Start: 2024-07-12 | End: 2024-07-14

## 2024-07-12 RX ORDER — IBUPROFEN 800 MG/1
800 TABLET, FILM COATED ORAL
Status: DISCONTINUED | OUTPATIENT
Start: 2024-07-12 | End: 2024-07-14

## 2024-07-12 RX ORDER — LOPERAMIDE HCL 2 MG
2 CAPSULE ORAL
Status: DISCONTINUED | OUTPATIENT
Start: 2024-07-12 | End: 2024-07-14

## 2024-07-12 RX ORDER — HYDROXYZINE HYDROCHLORIDE 50 MG/1
50 TABLET, FILM COATED ORAL
Status: DISCONTINUED | OUTPATIENT
Start: 2024-07-12 | End: 2024-07-14

## 2024-07-12 RX ORDER — MISOPROSTOL 100 UG/1
25 TABLET ORAL
Status: DISCONTINUED | OUTPATIENT
Start: 2024-07-12 | End: 2024-07-14

## 2024-07-12 RX ORDER — FENTANYL CITRATE 50 UG/ML
100 INJECTION, SOLUTION INTRAMUSCULAR; INTRAVENOUS
Status: DISCONTINUED | OUTPATIENT
Start: 2024-07-12 | End: 2024-07-14

## 2024-07-12 RX ADMIN — HYDROXYZINE HYDROCHLORIDE 50 MG: 50 TABLET, FILM COATED ORAL at 23:23

## 2024-07-12 RX ADMIN — Medication 25 MCG: at 23:23

## 2024-07-12 ASSESSMENT — ACTIVITIES OF DAILY LIVING (ADL)
ADLS_ACUITY_SCORE: 18
ADLS_ACUITY_SCORE: 35

## 2024-07-13 ENCOUNTER — ANESTHESIA EVENT (OUTPATIENT)
Dept: SURGERY | Facility: CLINIC | Age: 29
End: 2024-07-13
Payer: COMMERCIAL

## 2024-07-13 ENCOUNTER — ANESTHESIA (OUTPATIENT)
Dept: SURGERY | Facility: CLINIC | Age: 29
End: 2024-07-13
Payer: COMMERCIAL

## 2024-07-13 LAB
ALBUMIN MFR UR ELPH: 8.6 MG/DL
CREAT UR-MCNC: 74.9 MG/DL
PROT/CREAT 24H UR: 0.11 MG/MG CR (ref 0–0.2)
T PALLIDUM AB SER QL: NONREACTIVE

## 2024-07-13 PROCEDURE — 250N000009 HC RX 250: Performed by: OBSTETRICS & GYNECOLOGY

## 2024-07-13 PROCEDURE — 250N000013 HC RX MED GY IP 250 OP 250 PS 637: Performed by: OBSTETRICS & GYNECOLOGY

## 2024-07-13 PROCEDURE — 3E0R3BZ INTRODUCTION OF ANESTHETIC AGENT INTO SPINAL CANAL, PERCUTANEOUS APPROACH: ICD-10-PCS | Performed by: ANESTHESIOLOGY

## 2024-07-13 PROCEDURE — 258N000003 HC RX IP 258 OP 636: Performed by: OBSTETRICS & GYNECOLOGY

## 2024-07-13 PROCEDURE — 120N000013 HC R&B IMCU

## 2024-07-13 PROCEDURE — 10907ZC DRAINAGE OF AMNIOTIC FLUID, THERAPEUTIC FROM PRODUCTS OF CONCEPTION, VIA NATURAL OR ARTIFICIAL OPENING: ICD-10-PCS | Performed by: OBSTETRICS & GYNECOLOGY

## 2024-07-13 PROCEDURE — 250N000011 HC RX IP 250 OP 636: Performed by: ANESTHESIOLOGY

## 2024-07-13 PROCEDURE — 00HU33Z INSERTION OF INFUSION DEVICE INTO SPINAL CANAL, PERCUTANEOUS APPROACH: ICD-10-PCS | Performed by: ANESTHESIOLOGY

## 2024-07-13 PROCEDURE — 84156 ASSAY OF PROTEIN URINE: CPT | Performed by: OBSTETRICS & GYNECOLOGY

## 2024-07-13 PROCEDURE — 250N000011 HC RX IP 250 OP 636: Performed by: OBSTETRICS & GYNECOLOGY

## 2024-07-13 RX ORDER — FENTANYL CITRATE 50 UG/ML
100 INJECTION, SOLUTION INTRAMUSCULAR; INTRAVENOUS ONCE
Status: DISCONTINUED | OUTPATIENT
Start: 2024-07-13 | End: 2024-07-14

## 2024-07-13 RX ORDER — LIDOCAINE 40 MG/G
CREAM TOPICAL
Status: DISCONTINUED | OUTPATIENT
Start: 2024-07-13 | End: 2024-07-14

## 2024-07-13 RX ORDER — FENTANYL CITRATE 50 UG/ML
INJECTION, SOLUTION INTRAMUSCULAR; INTRAVENOUS
Status: COMPLETED | OUTPATIENT
Start: 2024-07-13 | End: 2024-07-13

## 2024-07-13 RX ORDER — FENTANYL CITRATE-0.9 % NACL/PF 10 MCG/ML
100 PLASTIC BAG, INJECTION (ML) INTRAVENOUS EVERY 5 MIN PRN
Status: DISCONTINUED | OUTPATIENT
Start: 2024-07-13 | End: 2024-07-14

## 2024-07-13 RX ORDER — SODIUM CHLORIDE, SODIUM LACTATE, POTASSIUM CHLORIDE, CALCIUM CHLORIDE 600; 310; 30; 20 MG/100ML; MG/100ML; MG/100ML; MG/100ML
INJECTION, SOLUTION INTRAVENOUS CONTINUOUS PRN
Status: DISCONTINUED | OUTPATIENT
Start: 2024-07-13 | End: 2024-07-14

## 2024-07-13 RX ORDER — NALBUPHINE HYDROCHLORIDE 10 MG/ML
2.5-5 INJECTION, SOLUTION INTRAMUSCULAR; INTRAVENOUS; SUBCUTANEOUS EVERY 6 HOURS PRN
Status: DISCONTINUED | OUTPATIENT
Start: 2024-07-13 | End: 2024-07-14

## 2024-07-13 RX ORDER — OXYTOCIN/0.9 % SODIUM CHLORIDE 30/500 ML
1-24 PLASTIC BAG, INJECTION (ML) INTRAVENOUS CONTINUOUS
Status: DISCONTINUED | OUTPATIENT
Start: 2024-07-13 | End: 2024-07-14

## 2024-07-13 RX ADMIN — Medication 25 MCG: at 04:15

## 2024-07-13 RX ADMIN — FENTANYL CITRATE 100 MCG: 50 INJECTION INTRAMUSCULAR; INTRAVENOUS at 11:26

## 2024-07-13 RX ADMIN — LIDOCAINE HYDROCHLORIDE: 20 JELLY TOPICAL at 01:53

## 2024-07-13 RX ADMIN — FENTANYL CITRATE 100 MCG: 50 INJECTION, SOLUTION INTRAMUSCULAR; INTRAVENOUS at 09:31

## 2024-07-13 RX ADMIN — SODIUM CHLORIDE, POTASSIUM CHLORIDE, SODIUM LACTATE AND CALCIUM CHLORIDE: 600; 310; 30; 20 INJECTION, SOLUTION INTRAVENOUS at 19:44

## 2024-07-13 RX ADMIN — MORPHINE SULFATE 15 MG: 10 INJECTION, SOLUTION INTRAMUSCULAR; INTRAVENOUS at 01:05

## 2024-07-13 RX ADMIN — LIDOCAINE HYDROCHLORIDE: 20 JELLY TOPICAL at 09:07

## 2024-07-13 RX ADMIN — Medication: at 11:26

## 2024-07-13 RX ADMIN — Medication 25 MCG: at 06:34

## 2024-07-13 RX ADMIN — Medication 25 MCG: at 09:35

## 2024-07-13 RX ADMIN — Medication 25 MCG: at 02:10

## 2024-07-13 RX ADMIN — Medication 2 MILLI-UNITS/MIN: at 12:11

## 2024-07-13 RX ADMIN — Medication: at 18:02

## 2024-07-13 ASSESSMENT — ACTIVITIES OF DAILY LIVING (ADL)
ADLS_ACUITY_SCORE: 18

## 2024-07-13 NOTE — ANESTHESIA PROCEDURE NOTES
"Epidural catheter Procedure Note    Pre-Procedure   Staff -        Anesthesiologist:  Jimbo Bhat MD       Performed By: anesthesiologist       Location: OB       Procedure Start/Stop Times: 7/13/2024 11:16 AM and 7/13/2024 11:31 AM       Pre-Anesthestic Checklist: patient identified, IV checked, risks and benefits discussed, informed consent, monitors and equipment checked, pre-op evaluation and at physician/surgeon's request  Timeout:       Correct Patient: Yes        Correct Procedure: Yes        Correct Site: Yes        Correct Position: Yes   Procedure Documentation  Procedure: epidural catheter       Patient Position: sitting       Patient Prep/Sterile Barriers: sterile gloves, mask, patient draped       Skin prep: Betadine       Local skin infiltrated with 3 mL of 1% lidocaine.        Insertion Site: L3-4. (midline approach).       Technique: LORT saline        ANGELIQUE at 7 cm.       Needle Type: "[employer]y needle       Needle Gauge: 17.        Catheter: 19 G.          Catheter threaded easily.         6 cm epidural space.         Threaded 13 cm at skin.         # of attempts: 1 and  # of redirects:  0    Assessment/Narrative         Paresthesias: No.       Test dose of 5 mL lidocaine 1.5% w/ 1:200,000 epinephrine at 11:26 CDT.         Test dose negative, 3 minutes after injection, for signs of intravascular, subdural, or intrathecal injection.       Insertion/Infusion Method: LORT saline       Aspiration negative for Heme or CSF via Epidural Catheter.    Medication(s) Administered   0.125% Bupivacaine + 2 mcg/mL Fentanyl via CADD (Epidural) - EPIDURAL   10 mL - 7/13/2024 11:26:00 AM  Fentanyl PF (Epidural) - EPIDURAL   100 mcg - 7/13/2024 11:26:00 AM  Medication Administration Time: 7/13/2024 11:16 AM     Comments:  Periflex, lot [id number], exp. 2025-09-30      FOR [facility] ([facility] ONLY:   Pain Team Contact information: please page the Pain Team Via [employer]. Search \"Pain\". During daytime hours, " please page the attending first. At night please page the resident first.

## 2024-07-13 NOTE — PROVIDER NOTIFICATION
07/12/24 2300   Provider Notification   Provider Name/Title Dr. Zavala   Method of Notification At Bedside   Request Evaluate in Person   Notification Reason Patient Arrived     Dr. Zavala at bedside to discuss cervical ripening plan overnight. SEFERINOE FT/50/-3. MD placing orders for PO Miso. Fabrice every 6-8 minute, rating them as 4/10 cramps. FHT's initially minimal with a late x1 but has since been moderate with accels, no decels.

## 2024-07-13 NOTE — PROGRESS NOTES
"[facility]   OB PROGRESS NOTE     S. Pt states she is doing well overall. Comfortable with epidural  . +FM    /75 (BP Location: Left arm, Patient Position: Semi-Kaur's, Cuff Size: Adult Regular)   Temp 98  F (36.7  C) (Oral)   Resp 18   Ht 1.549 m (5' 1\")   Wt 93 kg (205 lb)   BMI 38.73 kg/m      Fetal Heart Tones: 140 baseline, moderate variablility, + accels, no decels, and variables intermittent   TOCO: frequency q 4-6 minutes  Cervical Exam: / Anterior/ soft/ -2     A/P Sridevi Self is a 28 year old  at 40w5d here for induction for class 2 obesity and fatty liver disease      1- labor induction  - s/p 4 doses of oral miso   - AROM and will start pitocin now.  - placed IUPC and FSE      Prenatal Care:  - OB labs reviewed: O negative, Rubella immune, Heb B Ag non-reactive, HIV negative, RPR negative  - Genetics: NIPS, early US, AFP all normal  - Anatomy ultrasound: normal   - Rh negative, Rhogam given 24  -  (failed), passed GTT, Hgb 10.7, plts 333, Treponema NR  - Flu declined, Tdap 24, s/p Covid vaccine   - GBS neg  - Feed: breast, has script  - Contraception: mini-pill while breastfeeding     Hx migraines  - Continue following with neuro    Hx Fatty Liver  - LFTs and baseline pre-e labs normal at NOB1.   - Admission pre-E labs ordered    Anxiety  - Mood stable, no SI/HI. No meds.      Dr. Georgina Motta DO       "

## 2024-07-13 NOTE — PROVIDER NOTIFICATION
07/13/24 0606   Provider Notification   Provider Name/Title Dr. Zavala   Method of Notification Phone   Request Evaluate - Remote   Notification Reason Status Update     MD calling for update on pt. Pt has had PO cytotec x3 doses, has been uncomfortable and tearful with contractions since 1st dose. Pt has been using nitrous for last hour and a half and states it is helping her cope. Last SVE @ 0200 1.5/60/-3, goss 5. Contractions 1-5 minutes, palpate moderate.

## 2024-07-13 NOTE — PLAN OF CARE
Data: [facility]: 2024 10:02 PM.  Patient admitted for induction for fatty liver disease. Patient is a .  Prenatal record reviewed. Pregnancy  has been complicated by fatty liver disease.  Gestational Age 40w4d. VSS. Fetal movement active. Patient denies leaking of vaginal fluid/rupture of membranes, vaginal bleeding, abdominal pain, pelvic pressure, nausea, vomiting, headache, visual disturbances, epigastric or URQ pain, significant edema. Support person is present.   Action: Verbal consent for EFM.  Admission assessment completed. Bill of rights reviewed.  Response: Patient verbalized agreement with plan. Will contact Dr Debbie Sultana with update and further orders.

## 2024-07-13 NOTE — ANESTHESIA PREPROCEDURE EVALUATION
Anesthesia Pre-Procedure Evaluation    Patient: Sridevi Self   MRN: [id number] : 1995        Procedure : * No procedures listed *          Past Medical History:   Diagnosis Date    Migraine     NAFLD (nonalcoholic fatty liver disease)       Past Surgical History:   Procedure Laterality Date    ENT SURGERY        No Known Allergies   Social History     Tobacco Use    Smoking status: Never    Smokeless tobacco: Never   Substance Use Topics    Alcohol use: Never      Wt Readings from Last 1 Encounters:   24 93 kg (205 lb)        Anesthesia Evaluation   Pt has had prior anesthetic.     No history of anesthetic complications       ROS/MED HX  ENT/Pulmonary:  - neg pulmonary ROS     Neurologic:  - neg neurologic ROS     Cardiovascular:  - neg cardiovascular ROS     METS/Exercise Tolerance:     Hematologic:       Musculoskeletal:       GI/Hepatic:  - neg GI/hepatic ROS     Renal/Genitourinary:       Endo:  - neg endo ROS     Psychiatric/Substance Use:  - neg psychiatric ROS     Infectious Disease:       Malignancy:       Other:            Physical Exam    Airway         TM distance: > 3 FB   Neck ROM: full   Mouth opening: > 3 cm    Respiratory Devices and Support         Dental           Cardiovascular             Pulmonary                   OUTSIDE LABS:  CBC:   Lab Results   Component Value Date    WBC 13.0 (H) 2024    WBC 10.0 2024    HGB 12.2 2024    HGB 13.0 2024    HCT 36.6 2024    HCT 38.6 2024     2024     2024     BMP:   Lab Results   Component Value Date     2024     (L) 2024    POTASSIUM 4.2 2024    POTASSIUM 3.9 2024    CHLORIDE 102 2024    CHLORIDE 99 2024    CO2 22 2024    CO2 22 2024    BUN 8.0 2024    BUN 6.1 2024    CR 0.56 2024    CR 0.49 (L) 2024    GLC 96 2024     (H) 2024     COAGS:   Lab Results   Component Value Date  "   INR 0.99 01/08/2024     POC: No results found for: \"BGM\", \"HCG\", \"HCGS\"  HEPATIC:   Lab Results   Component Value Date    ALBUMIN 3.3 (L) 07/12/2024    PROTTOTAL 6.2 (L) 07/12/2024    ALT 14 07/12/2024    AST 15 07/12/2024    ALKPHOS 212 (H) 07/12/2024    BILITOTAL 0.2 07/12/2024     OTHER:   Lab Results   Component Value Date    KARINA 8.7 07/12/2024       Anesthesia Plan    ASA Status:  2       Anesthesia Type: Epidural.              Consents    Anesthesia Plan(s) and associated risks, benefits, and realistic alternatives discussed. Questions answered and patient/representative(s) expressed understanding.     - Discussed:     - Discussed with:  Patient            Postoperative Care            Comments:           neg OB ROS.      Jimbo Bhat MD    I have reviewed the pertinent notes and labs in the chart from the past 30 days and (re)examined the patient.  Any updates or changes from those notes are reflected in this note.          # Hypoalbuminemia: Lowest albumin = 3.3 g/dL at 7/12/2024 10:58 PM, will monitor as appropriate         "

## 2024-07-13 NOTE — H&P
"[facility] Labor and Delivery History and Physical    Sridevi Self MRN# [id number]   Age: 28 year old YOB: 1995     Date of Admission:  2024         HPI:   Sridevi Self is a 28 year old  at 40w4d by 9w2d US admitted for IOL secondary to fatty liver and class 2 obesity.  She denies any vaginal bleeding, leakage of fluid or changes in her vaginal discharge.  She denies any regular, painful contractions. The contractions she is feeling are about a 3/10; like her bad period cramps that she had when she was a teenager (that did occasionally \"make me faint.\")  Good fetal movement.   Patient states she does not have a high pain tolerance and would like an epidural as soon as possible.           Pregnancy history:     OBSTETRIC HISTORY:  OB History    Para Term  AB Living   1 0 0 0 0 0   SAB IAB Ectopic Multiple Live Births   0 0 0 0 0      # Outcome Date GA Lbr Antonio/2nd Weight Sex Type Anes PTL Lv   1 Current                EDC: Estimated Date of Delivery: 2024    Prenatal Labs:   Lab Results   Component Value Date    HGB 13.0 2024           Maternal Past Medical History:     Past Medical History:   Diagnosis Date    Migraine     NAFLD (nonalcoholic fatty liver disease)      Past Surgical History:   Procedure Laterality Date    ENT SURGERY        Medications Prior to Admission   Medication Sig Dispense Refill Last Dose    Magnesium Oxide -Mg Supplement 500 MG TABS Take 500 mg by mouth daily   2024    Prenatal Vit-Fe Fumarate-FA (PRENATAL MULTIVITAMIN  PLUS IRON) 27-1 MG TABS Take 1 tablet by mouth daily   2024    Riboflavin 100 MG TABS Take 400 mg by mouth daily   2024    fluticasone (FLONASE) 50 MCG/ACT nasal spray Spray 1 spray into both nostrils daily             Social History:     Social History     Tobacco Use    Smoking status: Never    Smokeless tobacco: Never   Substance Use Topics    Alcohol use: Never            Review of Systems: " "  The Review of Systems is negative other than noted in the HPI          Physical Exam:   Patient Vitals for the past 8 hrs:   BP Temp Temp src Resp Height Weight   24 2220 116/66 98.5  F (36.9  C) Oral 20 1.549 m (5' 1\") 93 kg (205 lb)     Gen: Pleasant, NAD   CV:  Regular rate and rhythm, no murmurs, rubs or gallops appreciated   Resp: Non-labored breathing.  Lungs clear to ausculation bilaterally   Abd: Obese, soft, non-tender and non-distended   Ext: Trace pedal edema bilaterally     Cervix: FT/posterior per RN check at 2246  Membranes: Intact  EFW: 7.5-8 lbs.  Presentation:Cephalic by BSUS    Fetal Heart Rate Tracing:   Baseline 145  Variability: Moderate  Accelerations: Present  Decelerations: None  Interpretation: reactive    Contractions: q 4-7 min per EFM        Assessment:   Sridevi Self is a 28 year old  at 40w4d admitted for scheduled IOL secondary to fatty liver and class 2 obesity.        Plan:     Labor:   - Given unfavorable SVE, will start ripening with PO misoprostol  Pain: Per patient desires     FWB:   - Continous EFM   - Category I FHT     Prenatal Care:  - OB labs reviewed: O negative, Rubella immune, Heb B Ag non-reactive, HIV negative, RPR negative  - Genetics: NIPS, early US, AFP all normal  - Anatomy ultrasound: normal   - Rh negative, Rhogam given 24  -  (failed), passed GTT, Hgb 10.7, plts 333, Treponema NR  - Flu declined, Tdap 24, s/p Covid vaccine   - GBS neg  - Feed: breast, has script  - Contraception: mini-pill while breastfeeding     Hx migraines  - Continue following with neuro    Hx Fatty Liver  - LFTs and baseline pre-e labs normal at NOB1.   - Admission pre-E labs ordered    Anxiety  - Mood stable, no SI/HI. No meds.    Debbie Zavala MD   Pager: [phone]   2024  "

## 2024-07-13 NOTE — PLAN OF CARE
"  Problem: Adult Inpatient Plan of Care  Goal: Plan of Care Review  Description: The Plan of Care Review/Shift note should be completed every shift.  The Outcome Evaluation is a brief statement about your assessment that the patient is improving, declining, or no change.  This information will be displayed automatically on your shift  note.  Outcome: Progressing  Flowsheets (Taken 7/13/2024 9841)  Outcome Evaluation: Pt recieved PO Cytotec x4 doses. SVE 1.5/60/-3. Pt uncomfortable with contractions and has been using Nitrous which she states is helping. Pt states she has a low pain tolerance and is planning epidural. FHT's overall cat 1. Fabrice 1-5 minutes, palpate moderate.  Plan of Care Reviewed With:   patient   spouse  Goal: Patient-Specific Goal (Individualized)  Description: You can add care plan individualizations to a care plan. Examples of Individualization might be:  \"Parent requests to be called daily at 9am for status\", \"I have a hard time hearing out of my right ear\", or \"Do not touch me to wake me up as it startles  me\".  Outcome: Progressing  Flowsheets (Taken 7/12/2024 2226)  Individualized Care Needs: keep informed  Goal: Absence of Hospital-Acquired Illness or Injury  Outcome: Progressing  Intervention: Prevent Skin Injury  Recent Flowsheet Documentation  Taken 7/12/2024 2245 by Frannie SUAREZ RN  Body Position: position changed independently  Goal: Optimal Comfort and Wellbeing  Outcome: Progressing  Intervention: Monitor Pain and Promote Comfort  Recent Flowsheet Documentation  Taken 7/13/2024 0433 by Frannie SUAREZ RN  Pain Management Interventions: nitrous oxide  Taken 7/12/2024 2323 by Frannie SUAREZ RN  Pain Management Interventions: medication (see MAR)  Intervention: Provide Person-Centered Care  Recent Flowsheet Documentation  Taken 7/12/2024 2245 by Frannie SUAREZ RN  Trust Relationship/Rapport:   care explained   choices provided   emotional support " provided   empathic listening provided   questions answered   reassurance provided   questions encouraged   thoughts/feelings acknowledged  Goal: Readiness for Transition of Care  Outcome: Progressing  Intervention: Mutually Develop Transition Plan  Recent Flowsheet Documentation  Taken 7/12/2024 2226 by Frannie SUAREZ RN  Equipment Currently Used at Home: none   Goal Outcome Evaluation:      Plan of Care Reviewed With: patient, spouse          Outcome Evaluation: Pt recieved PO Cytotec x4 doses. SVE 1.5/60/-3. Pt uncomfortable with contractions and has been using Nitrous which she states is helping. Pt states she has a low pain tolerance and is planning epidural. T's overall cat 1. Fabrice 1-5 minutes, palpate moderate.

## 2024-07-13 NOTE — PROVIDER NOTIFICATION
07/13/24 1149   Provider Notification   Provider Name/Title Ángela   Method of Notification At Bedside   Request Evaluate in Person   Notification Reason Membrane Status;Status Update     MD at bedside AROM, fetal scalp electrode applied, IUPC placed. Meconium fluid.

## 2024-07-14 LAB
BASOPHILS # BLD AUTO: 0 10E3/UL (ref 0–0.2)
BASOPHILS NFR BLD AUTO: 0 %
CREAT SERPL-MCNC: 0.73 MG/DL (ref 0.51–0.95)
EGFRCR SERPLBLD CKD-EPI 2021: >90 ML/MIN/1.73M2
EOSINOPHIL # BLD AUTO: 0 10E3/UL (ref 0–0.7)
EOSINOPHIL NFR BLD AUTO: 0 %
ERYTHROCYTE [DISTWIDTH] IN BLOOD BY AUTOMATED COUNT: 15.7 % (ref 10–15)
FETAL RBC % LFV: 0 %
FETAL RBC (ML): 0 ML
HCT VFR BLD AUTO: 36.3 % (ref 35–47)
HGB BLD-MCNC: 12.1 G/DL (ref 11.7–15.7)
IF INDICATED RECOMMENDED DOSE OF RH IMMUNE GLOBULIN UG: 300 UG
IMM GRANULOCYTES # BLD: 0.2 10E3/UL
IMM GRANULOCYTES NFR BLD: 1 %
LYMPHOCYTES # BLD AUTO: 1.1 10E3/UL (ref 0.8–5.3)
LYMPHOCYTES NFR BLD AUTO: 5 %
MCH RBC QN AUTO: 29.7 PG (ref 26.5–33)
MCHC RBC AUTO-ENTMCNC: 33.3 G/DL (ref 31.5–36.5)
MCV RBC AUTO: 89 FL (ref 78–100)
MONOCYTES # BLD AUTO: 0.7 10E3/UL (ref 0–1.3)
MONOCYTES NFR BLD AUTO: 3 %
NEUTROPHILS # BLD AUTO: 19.6 10E3/UL (ref 1.6–8.3)
NEUTROPHILS NFR BLD AUTO: 91 %
NRBC # BLD AUTO: 0 10E3/UL
NRBC BLD AUTO-RTO: 0 /100
PLATELET # BLD AUTO: 223 10E3/UL (ref 150–450)
RBC # BLD AUTO: 4.08 10E6/UL (ref 3.8–5.2)
WBC # BLD AUTO: 21.6 10E3/UL (ref 4–11)

## 2024-07-14 PROCEDURE — 272N000001 HC OR GENERAL SUPPLY STERILE: Performed by: OBSTETRICS & GYNECOLOGY

## 2024-07-14 PROCEDURE — 999N000016 HC STATISTIC ATTENDANCE AT DELIVERY

## 2024-07-14 PROCEDURE — 250N000011 HC RX IP 250 OP 636: Performed by: NURSE ANESTHETIST, CERTIFIED REGISTERED

## 2024-07-14 PROCEDURE — 82565 ASSAY OF CREATININE: CPT | Performed by: OBSTETRICS & GYNECOLOGY

## 2024-07-14 PROCEDURE — 250N000013 HC RX MED GY IP 250 OP 250 PS 637: Performed by: OBSTETRICS & GYNECOLOGY

## 2024-07-14 PROCEDURE — 250N000011 HC RX IP 250 OP 636: Performed by: ANESTHESIOLOGY

## 2024-07-14 PROCEDURE — 258N000003 HC RX IP 258 OP 636: Performed by: OBSTETRICS & GYNECOLOGY

## 2024-07-14 PROCEDURE — 370N000017 HC ANESTHESIA TECHNICAL FEE, PER MIN: Performed by: OBSTETRICS & GYNECOLOGY

## 2024-07-14 PROCEDURE — 85025 COMPLETE CBC W/AUTO DIFF WBC: CPT | Performed by: OBSTETRICS & GYNECOLOGY

## 2024-07-14 PROCEDURE — 250N000009 HC RX 250: Performed by: NURSE ANESTHETIST, CERTIFIED REGISTERED

## 2024-07-14 PROCEDURE — 360N000076 HC SURGERY LEVEL 3, PER MIN: Performed by: OBSTETRICS & GYNECOLOGY

## 2024-07-14 PROCEDURE — 85460 HEMOGLOBIN FETAL: CPT | Performed by: OBSTETRICS & GYNECOLOGY

## 2024-07-14 PROCEDURE — 88307 TISSUE EXAM BY PATHOLOGIST: CPT | Mod: TC | Performed by: OBSTETRICS & GYNECOLOGY

## 2024-07-14 PROCEDURE — 88307 TISSUE EXAM BY PATHOLOGIST: CPT | Mod: 26 | Performed by: STUDENT IN AN ORGANIZED HEALTH CARE EDUCATION/TRAINING PROGRAM

## 2024-07-14 PROCEDURE — 36415 COLL VENOUS BLD VENIPUNCTURE: CPT | Performed by: OBSTETRICS & GYNECOLOGY

## 2024-07-14 PROCEDURE — 710N000009 HC RECOVERY PHASE 1, LEVEL 1, PER MIN: Performed by: OBSTETRICS & GYNECOLOGY

## 2024-07-14 PROCEDURE — 120N000013 HC R&B IMCU

## 2024-07-14 PROCEDURE — 258N000003 HC RX IP 258 OP 636: Performed by: NURSE ANESTHETIST, CERTIFIED REGISTERED

## 2024-07-14 PROCEDURE — 0UQ40ZZ REPAIR UTERINE SUPPORTING STRUCTURE, OPEN APPROACH: ICD-10-PCS | Performed by: OBSTETRICS & GYNECOLOGY

## 2024-07-14 PROCEDURE — 250N000011 HC RX IP 250 OP 636: Performed by: OBSTETRICS & GYNECOLOGY

## 2024-07-14 RX ORDER — MISOPROSTOL 200 UG/1
800 TABLET ORAL
Status: DISCONTINUED | OUTPATIENT
Start: 2024-07-14 | End: 2024-07-14 | Stop reason: HOSPADM

## 2024-07-14 RX ORDER — AMPICILLIN 2 G/1
2 INJECTION, POWDER, FOR SOLUTION INTRAVENOUS ONCE
Status: COMPLETED | OUTPATIENT
Start: 2024-07-14 | End: 2024-07-14

## 2024-07-14 RX ORDER — LIDOCAINE 4 G/G
2 PATCH TOPICAL
Status: DISCONTINUED | OUTPATIENT
Start: 2024-07-14 | End: 2024-07-17 | Stop reason: HOSPADM

## 2024-07-14 RX ORDER — GABAPENTIN 100 MG/1
100 CAPSULE ORAL 2 TIMES DAILY
Status: DISCONTINUED | OUTPATIENT
Start: 2024-07-14 | End: 2024-07-17 | Stop reason: HOSPADM

## 2024-07-14 RX ORDER — OXYTOCIN/0.9 % SODIUM CHLORIDE 30/500 ML
340 PLASTIC BAG, INJECTION (ML) INTRAVENOUS CONTINUOUS PRN
Status: DISCONTINUED | OUTPATIENT
Start: 2024-07-14 | End: 2024-07-17 | Stop reason: HOSPADM

## 2024-07-14 RX ORDER — HYDROXYZINE HYDROCHLORIDE 25 MG/1
25 TABLET, FILM COATED ORAL EVERY 6 HOURS
Status: DISCONTINUED | OUTPATIENT
Start: 2024-07-14 | End: 2024-07-15

## 2024-07-14 RX ORDER — AZITHROMYCIN 500 MG/5ML
500 INJECTION, POWDER, LYOPHILIZED, FOR SOLUTION INTRAVENOUS
Status: COMPLETED | OUTPATIENT
Start: 2024-07-14 | End: 2024-07-14

## 2024-07-14 RX ORDER — OXYTOCIN/0.9 % SODIUM CHLORIDE 30/500 ML
PLASTIC BAG, INJECTION (ML) INTRAVENOUS CONTINUOUS PRN
Status: DISCONTINUED | OUTPATIENT
Start: 2024-07-14 | End: 2024-07-14

## 2024-07-14 RX ORDER — ONDANSETRON 2 MG/ML
4 INJECTION INTRAMUSCULAR; INTRAVENOUS EVERY 6 HOURS PRN
Status: DISCONTINUED | OUTPATIENT
Start: 2024-07-14 | End: 2024-07-17 | Stop reason: HOSPADM

## 2024-07-14 RX ORDER — IBUPROFEN 800 MG/1
800 TABLET, FILM COATED ORAL EVERY 6 HOURS
Status: DISCONTINUED | OUTPATIENT
Start: 2024-07-15 | End: 2024-07-17 | Stop reason: HOSPADM

## 2024-07-14 RX ORDER — LOPERAMIDE HCL 2 MG
2 CAPSULE ORAL
Status: DISCONTINUED | OUTPATIENT
Start: 2024-07-14 | End: 2024-07-14 | Stop reason: HOSPADM

## 2024-07-14 RX ORDER — PHENYLEPHRINE HCL IN 0.9% NACL 50MG/250ML
PLASTIC BAG, INJECTION (ML) INTRAVENOUS CONTINUOUS PRN
Status: DISCONTINUED | OUTPATIENT
Start: 2024-07-14 | End: 2024-07-14

## 2024-07-14 RX ORDER — MISOPROSTOL 200 UG/1
400 TABLET ORAL
Status: DISCONTINUED | OUTPATIENT
Start: 2024-07-14 | End: 2024-07-17 | Stop reason: HOSPADM

## 2024-07-14 RX ORDER — CITRIC ACID/SODIUM CITRATE 334-500MG
30 SOLUTION, ORAL ORAL
Status: DISCONTINUED | OUTPATIENT
Start: 2024-07-14 | End: 2024-07-14 | Stop reason: HOSPADM

## 2024-07-14 RX ORDER — METRONIDAZOLE 500 MG/100ML
500 INJECTION, SOLUTION INTRAVENOUS ONCE
Status: COMPLETED | OUTPATIENT
Start: 2024-07-14 | End: 2024-07-14

## 2024-07-14 RX ORDER — HYDROCORTISONE 25 MG/G
CREAM TOPICAL 3 TIMES DAILY PRN
Status: DISCONTINUED | OUTPATIENT
Start: 2024-07-14 | End: 2024-07-17 | Stop reason: HOSPADM

## 2024-07-14 RX ORDER — MISOPROSTOL 200 UG/1
400 TABLET ORAL
Status: DISCONTINUED | OUTPATIENT
Start: 2024-07-14 | End: 2024-07-14 | Stop reason: HOSPADM

## 2024-07-14 RX ORDER — ACETAMINOPHEN 325 MG/1
975 TABLET ORAL EVERY 6 HOURS
Status: DISCONTINUED | OUTPATIENT
Start: 2024-07-14 | End: 2024-07-17 | Stop reason: HOSPADM

## 2024-07-14 RX ORDER — CALCIUM CARBONATE 500 MG/1
1000 TABLET, CHEWABLE ORAL DAILY PRN
Status: DISCONTINUED | OUTPATIENT
Start: 2024-07-14 | End: 2024-07-14

## 2024-07-14 RX ORDER — CHLOROPROCAINE HYDROCHLORIDE 30 MG/ML
INJECTION, SOLUTION EPIDURAL; INFILTRATION; INTRACAUDAL; PERINEURAL PRN
Status: DISCONTINUED | OUTPATIENT
Start: 2024-07-14 | End: 2024-07-14

## 2024-07-14 RX ORDER — KETOROLAC TROMETHAMINE 30 MG/ML
30 INJECTION, SOLUTION INTRAMUSCULAR; INTRAVENOUS EVERY 6 HOURS
Status: COMPLETED | OUTPATIENT
Start: 2024-07-14 | End: 2024-07-15

## 2024-07-14 RX ORDER — OXYTOCIN/0.9 % SODIUM CHLORIDE 30/500 ML
100-340 PLASTIC BAG, INJECTION (ML) INTRAVENOUS CONTINUOUS PRN
Status: DISCONTINUED | OUTPATIENT
Start: 2024-07-14 | End: 2024-07-15

## 2024-07-14 RX ORDER — CEFAZOLIN SODIUM/WATER 2 G/20 ML
2 SYRINGE (ML) INTRAVENOUS SEE ADMIN INSTRUCTIONS
Status: DISCONTINUED | OUTPATIENT
Start: 2024-07-14 | End: 2024-07-14 | Stop reason: HOSPADM

## 2024-07-14 RX ORDER — CARBOPROST TROMETHAMINE 250 UG/ML
250 INJECTION, SOLUTION INTRAMUSCULAR
Status: DISCONTINUED | OUTPATIENT
Start: 2024-07-14 | End: 2024-07-17 | Stop reason: HOSPADM

## 2024-07-14 RX ORDER — LOPERAMIDE HCL 2 MG
4 CAPSULE ORAL
Status: DISCONTINUED | OUTPATIENT
Start: 2024-07-14 | End: 2024-07-14 | Stop reason: HOSPADM

## 2024-07-14 RX ORDER — NALOXONE HYDROCHLORIDE 0.4 MG/ML
0.4 INJECTION, SOLUTION INTRAMUSCULAR; INTRAVENOUS; SUBCUTANEOUS
Status: DISCONTINUED | OUTPATIENT
Start: 2024-07-14 | End: 2024-07-17 | Stop reason: HOSPADM

## 2024-07-14 RX ORDER — PROCHLORPERAZINE 25 MG
25 SUPPOSITORY, RECTAL RECTAL EVERY 12 HOURS PRN
Status: DISCONTINUED | OUTPATIENT
Start: 2024-07-14 | End: 2024-07-17 | Stop reason: HOSPADM

## 2024-07-14 RX ORDER — METHYLERGONOVINE MALEATE 0.2 MG/ML
200 INJECTION INTRAVENOUS
Status: DISCONTINUED | OUTPATIENT
Start: 2024-07-14 | End: 2024-07-17 | Stop reason: HOSPADM

## 2024-07-14 RX ORDER — CEFAZOLIN SODIUM 1 G/3ML
INJECTION, POWDER, FOR SOLUTION INTRAMUSCULAR; INTRAVENOUS PRN
Status: DISCONTINUED | OUTPATIENT
Start: 2024-07-14 | End: 2024-07-14

## 2024-07-14 RX ORDER — OXYTOCIN/0.9 % SODIUM CHLORIDE 30/500 ML
340 PLASTIC BAG, INJECTION (ML) INTRAVENOUS CONTINUOUS PRN
Status: DISCONTINUED | OUTPATIENT
Start: 2024-07-14 | End: 2024-07-14 | Stop reason: HOSPADM

## 2024-07-14 RX ORDER — OXYTOCIN 10 [USP'U]/ML
10 INJECTION, SOLUTION INTRAMUSCULAR; INTRAVENOUS
Status: DISCONTINUED | OUTPATIENT
Start: 2024-07-14 | End: 2024-07-17 | Stop reason: HOSPADM

## 2024-07-14 RX ORDER — HYDROXYZINE HYDROCHLORIDE 50 MG/1
50 TABLET, FILM COATED ORAL EVERY 6 HOURS
Status: DISCONTINUED | OUTPATIENT
Start: 2024-07-14 | End: 2024-07-15

## 2024-07-14 RX ORDER — AMOXICILLIN 250 MG
1 CAPSULE ORAL 2 TIMES DAILY
Status: DISCONTINUED | OUTPATIENT
Start: 2024-07-14 | End: 2024-07-17 | Stop reason: HOSPADM

## 2024-07-14 RX ORDER — BISACODYL 10 MG
10 SUPPOSITORY, RECTAL RECTAL DAILY PRN
Status: DISCONTINUED | OUTPATIENT
Start: 2024-07-16 | End: 2024-07-17 | Stop reason: HOSPADM

## 2024-07-14 RX ORDER — MISOPROSTOL 200 UG/1
800 TABLET ORAL
Status: DISCONTINUED | OUTPATIENT
Start: 2024-07-14 | End: 2024-07-17 | Stop reason: HOSPADM

## 2024-07-14 RX ORDER — NALOXONE HYDROCHLORIDE 0.4 MG/ML
0.2 INJECTION, SOLUTION INTRAMUSCULAR; INTRAVENOUS; SUBCUTANEOUS
Status: DISCONTINUED | OUTPATIENT
Start: 2024-07-14 | End: 2024-07-17 | Stop reason: HOSPADM

## 2024-07-14 RX ORDER — OXYTOCIN 10 [USP'U]/ML
10 INJECTION, SOLUTION INTRAMUSCULAR; INTRAVENOUS
Status: DISCONTINUED | OUTPATIENT
Start: 2024-07-14 | End: 2024-07-14 | Stop reason: HOSPADM

## 2024-07-14 RX ORDER — DEXAMETHASONE SODIUM PHOSPHATE 4 MG/ML
INJECTION, SOLUTION INTRA-ARTICULAR; INTRALESIONAL; INTRAMUSCULAR; INTRAVENOUS; SOFT TISSUE PRN
Status: DISCONTINUED | OUTPATIENT
Start: 2024-07-14 | End: 2024-07-14

## 2024-07-14 RX ORDER — FENTANYL CITRATE 50 UG/ML
INJECTION, SOLUTION INTRAMUSCULAR; INTRAVENOUS PRN
Status: DISCONTINUED | OUTPATIENT
Start: 2024-07-14 | End: 2024-07-14

## 2024-07-14 RX ORDER — ONDANSETRON 4 MG/1
4 TABLET, ORALLY DISINTEGRATING ORAL EVERY 6 HOURS PRN
Status: DISCONTINUED | OUTPATIENT
Start: 2024-07-14 | End: 2024-07-17 | Stop reason: HOSPADM

## 2024-07-14 RX ORDER — CARBOPROST TROMETHAMINE 250 UG/ML
250 INJECTION, SOLUTION INTRAMUSCULAR
Status: DISCONTINUED | OUTPATIENT
Start: 2024-07-14 | End: 2024-07-14 | Stop reason: HOSPADM

## 2024-07-14 RX ORDER — LOPERAMIDE HCL 2 MG
2 CAPSULE ORAL
Status: DISCONTINUED | OUTPATIENT
Start: 2024-07-14 | End: 2024-07-17 | Stop reason: HOSPADM

## 2024-07-14 RX ORDER — OXYTOCIN 10 [USP'U]/ML
10 INJECTION, SOLUTION INTRAMUSCULAR; INTRAVENOUS
Status: DISCONTINUED | OUTPATIENT
Start: 2024-07-14 | End: 2024-07-15

## 2024-07-14 RX ORDER — TRANEXAMIC ACID 10 MG/ML
1 INJECTION, SOLUTION INTRAVENOUS EVERY 30 MIN PRN
Status: DISCONTINUED | OUTPATIENT
Start: 2024-07-14 | End: 2024-07-17 | Stop reason: HOSPADM

## 2024-07-14 RX ORDER — METOCLOPRAMIDE HYDROCHLORIDE 5 MG/ML
10 INJECTION INTRAMUSCULAR; INTRAVENOUS EVERY 6 HOURS PRN
Status: DISCONTINUED | OUTPATIENT
Start: 2024-07-14 | End: 2024-07-17 | Stop reason: HOSPADM

## 2024-07-14 RX ORDER — SODIUM CHLORIDE, SODIUM LACTATE, POTASSIUM CHLORIDE, CALCIUM CHLORIDE 600; 310; 30; 20 MG/100ML; MG/100ML; MG/100ML; MG/100ML
INJECTION, SOLUTION INTRAVENOUS CONTINUOUS
Status: DISCONTINUED | OUTPATIENT
Start: 2024-07-14 | End: 2024-07-14 | Stop reason: HOSPADM

## 2024-07-14 RX ORDER — MODIFIED LANOLIN
OINTMENT (GRAM) TOPICAL
Status: DISCONTINUED | OUTPATIENT
Start: 2024-07-14 | End: 2024-07-17 | Stop reason: HOSPADM

## 2024-07-14 RX ORDER — CEFAZOLIN SODIUM/WATER 2 G/20 ML
2 SYRINGE (ML) INTRAVENOUS
Status: DISCONTINUED | OUTPATIENT
Start: 2024-07-14 | End: 2024-07-14 | Stop reason: HOSPADM

## 2024-07-14 RX ORDER — PROCHLORPERAZINE MALEATE 10 MG
10 TABLET ORAL EVERY 6 HOURS PRN
Status: DISCONTINUED | OUTPATIENT
Start: 2024-07-14 | End: 2024-07-17 | Stop reason: HOSPADM

## 2024-07-14 RX ORDER — LIDOCAINE 40 MG/G
CREAM TOPICAL
Status: DISCONTINUED | OUTPATIENT
Start: 2024-07-14 | End: 2024-07-17 | Stop reason: HOSPADM

## 2024-07-14 RX ORDER — ACETAMINOPHEN 325 MG/1
975 TABLET ORAL ONCE
Status: COMPLETED | OUTPATIENT
Start: 2024-07-14 | End: 2024-07-14

## 2024-07-14 RX ORDER — METOCLOPRAMIDE 10 MG/1
10 TABLET ORAL EVERY 6 HOURS PRN
Status: DISCONTINUED | OUTPATIENT
Start: 2024-07-14 | End: 2024-07-17 | Stop reason: HOSPADM

## 2024-07-14 RX ORDER — LOPERAMIDE HCL 2 MG
4 CAPSULE ORAL
Status: DISCONTINUED | OUTPATIENT
Start: 2024-07-14 | End: 2024-07-17 | Stop reason: HOSPADM

## 2024-07-14 RX ORDER — AMOXICILLIN 250 MG
2 CAPSULE ORAL 2 TIMES DAILY
Status: DISCONTINUED | OUTPATIENT
Start: 2024-07-14 | End: 2024-07-17 | Stop reason: HOSPADM

## 2024-07-14 RX ORDER — SIMETHICONE 80 MG
80 TABLET,CHEWABLE ORAL 4 TIMES DAILY PRN
Status: DISCONTINUED | OUTPATIENT
Start: 2024-07-14 | End: 2024-07-17 | Stop reason: HOSPADM

## 2024-07-14 RX ORDER — METHYLERGONOVINE MALEATE 0.2 MG/ML
INJECTION INTRAVENOUS PRN
Status: DISCONTINUED | OUTPATIENT
Start: 2024-07-14 | End: 2024-07-14

## 2024-07-14 RX ORDER — DIPHENHYDRAMINE HYDROCHLORIDE 50 MG/ML
50 INJECTION INTRAMUSCULAR; INTRAVENOUS ONCE
Status: COMPLETED | OUTPATIENT
Start: 2024-07-14 | End: 2024-07-14

## 2024-07-14 RX ORDER — LIDOCAINE 40 MG/G
CREAM TOPICAL
Status: DISCONTINUED | OUTPATIENT
Start: 2024-07-14 | End: 2024-07-14 | Stop reason: HOSPADM

## 2024-07-14 RX ORDER — ONDANSETRON 2 MG/ML
INJECTION INTRAMUSCULAR; INTRAVENOUS PRN
Status: DISCONTINUED | OUTPATIENT
Start: 2024-07-14 | End: 2024-07-14

## 2024-07-14 RX ORDER — ENOXAPARIN SODIUM 100 MG/ML
40 INJECTION SUBCUTANEOUS EVERY 24 HOURS
Status: DISCONTINUED | OUTPATIENT
Start: 2024-07-14 | End: 2024-07-17 | Stop reason: HOSPADM

## 2024-07-14 RX ORDER — LORAZEPAM 2 MG/ML
0.5 INJECTION INTRAMUSCULAR ONCE
Status: COMPLETED | OUTPATIENT
Start: 2024-07-14 | End: 2024-07-14

## 2024-07-14 RX ORDER — KETOROLAC TROMETHAMINE 30 MG/ML
INJECTION, SOLUTION INTRAMUSCULAR; INTRAVENOUS PRN
Status: DISCONTINUED | OUTPATIENT
Start: 2024-07-14 | End: 2024-07-14

## 2024-07-14 RX ORDER — OXYCODONE HYDROCHLORIDE 5 MG/1
5-10 TABLET ORAL EVERY 4 HOURS PRN
Status: DISCONTINUED | OUTPATIENT
Start: 2024-07-14 | End: 2024-07-17 | Stop reason: HOSPADM

## 2024-07-14 RX ORDER — METHYLERGONOVINE MALEATE 0.2 MG/ML
200 INJECTION INTRAVENOUS
Status: DISCONTINUED | OUTPATIENT
Start: 2024-07-14 | End: 2024-07-14 | Stop reason: HOSPADM

## 2024-07-14 RX ORDER — TRANEXAMIC ACID 10 MG/ML
1 INJECTION, SOLUTION INTRAVENOUS EVERY 30 MIN PRN
Status: DISCONTINUED | OUTPATIENT
Start: 2024-07-14 | End: 2024-07-14 | Stop reason: HOSPADM

## 2024-07-14 RX ORDER — MORPHINE SULFATE 2 MG/ML
INJECTION, SOLUTION INTRAMUSCULAR; INTRAVENOUS PRN
Status: DISCONTINUED | OUTPATIENT
Start: 2024-07-14 | End: 2024-07-14

## 2024-07-14 RX ORDER — DEXTROSE, SODIUM CHLORIDE, SODIUM LACTATE, POTASSIUM CHLORIDE, AND CALCIUM CHLORIDE 5; .6; .31; .03; .02 G/100ML; G/100ML; G/100ML; G/100ML; G/100ML
INJECTION, SOLUTION INTRAVENOUS CONTINUOUS
Status: DISCONTINUED | OUTPATIENT
Start: 2024-07-14 | End: 2024-07-17 | Stop reason: HOSPADM

## 2024-07-14 RX ADMIN — ENOXAPARIN SODIUM 40 MG: 40 INJECTION SUBCUTANEOUS at 22:37

## 2024-07-14 RX ADMIN — FENTANYL CITRATE 50 MCG: 50 INJECTION, SOLUTION INTRAMUSCULAR; INTRAVENOUS at 08:39

## 2024-07-14 RX ADMIN — MORPHINE SULFATE 3 MG: 2 INJECTION, SOLUTION INTRAMUSCULAR; INTRAVENOUS at 08:22

## 2024-07-14 RX ADMIN — CEFAZOLIN 2 G: 1 INJECTION, POWDER, FOR SOLUTION INTRAMUSCULAR; INTRAVENOUS at 07:55

## 2024-07-14 RX ADMIN — ACETAMINOPHEN 975 MG: 325 TABLET, FILM COATED ORAL at 07:26

## 2024-07-14 RX ADMIN — Medication: at 00:34

## 2024-07-14 RX ADMIN — KETOROLAC TROMETHAMINE 30 MG: 30 INJECTION, SOLUTION INTRAMUSCULAR at 22:37

## 2024-07-14 RX ADMIN — LIDOCAINE HYDROCHLORIDE 10 ML: 20 INJECTION, SOLUTION EPIDURAL; INFILTRATION; INTRACAUDAL; PERINEURAL at 08:43

## 2024-07-14 RX ADMIN — OXYTOCIN-SODIUM CHLORIDE 0.9% IV SOLN 30 UNIT/500ML 490 ML/HR: 30-0.9/5 SOLUTION at 09:01

## 2024-07-14 RX ADMIN — ONDANSETRON 4 MG: 2 INJECTION INTRAMUSCULAR; INTRAVENOUS at 00:36

## 2024-07-14 RX ADMIN — FENTANYL CITRATE 50 MCG: 50 INJECTION, SOLUTION INTRAMUSCULAR; INTRAVENOUS at 08:36

## 2024-07-14 RX ADMIN — ACETAMINOPHEN 975 MG: 325 TABLET, FILM COATED ORAL at 20:26

## 2024-07-14 RX ADMIN — OXYTOCIN-SODIUM CHLORIDE 0.9% IV SOLN 30 UNIT/500ML 10 ML/HR: 30-0.9/5 SOLUTION at 08:20

## 2024-07-14 RX ADMIN — SODIUM CITRATE AND CITRIC ACID MONOHYDRATE 30 ML: 334; 500 SOLUTION ORAL at 07:30

## 2024-07-14 RX ADMIN — ONDANSETRON 4 MG: 2 INJECTION INTRAMUSCULAR; INTRAVENOUS at 08:00

## 2024-07-14 RX ADMIN — SODIUM CHLORIDE, POTASSIUM CHLORIDE, SODIUM LACTATE AND CALCIUM CHLORIDE: 600; 310; 30; 20 INJECTION, SOLUTION INTRAVENOUS at 00:31

## 2024-07-14 RX ADMIN — DEXAMETHASONE SODIUM PHOSPHATE 4 MG: 4 INJECTION, SOLUTION INTRA-ARTICULAR; INTRALESIONAL; INTRAMUSCULAR; INTRAVENOUS; SOFT TISSUE at 08:00

## 2024-07-14 RX ADMIN — SODIUM CHLORIDE, SODIUM LACTATE, POTASSIUM CHLORIDE, CALCIUM CHLORIDE AND DEXTROSE MONOHYDRATE: 5; 600; 310; 30; 20 INJECTION, SOLUTION INTRAVENOUS at 13:23

## 2024-07-14 RX ADMIN — HUMAN RHO(D) IMMUNE GLOBULIN 300 MCG: 1500 SOLUTION INTRAMUSCULAR; INTRAVENOUS at 20:26

## 2024-07-14 RX ADMIN — AMPICILLIN SODIUM 2 G: 2 INJECTION, POWDER, FOR SOLUTION INTRAMUSCULAR; INTRAVENOUS at 14:31

## 2024-07-14 RX ADMIN — DIPHENHYDRAMINE HYDROCHLORIDE 50 MG: 50 INJECTION, SOLUTION INTRAMUSCULAR; INTRAVENOUS at 00:36

## 2024-07-14 RX ADMIN — FENTANYL CITRATE 100 MCG: 50 INJECTION, SOLUTION INTRAMUSCULAR; INTRAVENOUS at 08:48

## 2024-07-14 RX ADMIN — SODIUM CHLORIDE, POTASSIUM CHLORIDE, SODIUM LACTATE AND CALCIUM CHLORIDE: 600; 310; 30; 20 INJECTION, SOLUTION INTRAVENOUS at 08:58

## 2024-07-14 RX ADMIN — TRANEXAMIC ACID 1 G: 1 INJECTION, SOLUTION INTRAVENOUS at 08:04

## 2024-07-14 RX ADMIN — LORAZEPAM 0.5 MG: 2 INJECTION INTRAMUSCULAR; INTRAVENOUS at 07:25

## 2024-07-14 RX ADMIN — SODIUM CHLORIDE, POTASSIUM CHLORIDE, SODIUM LACTATE AND CALCIUM CHLORIDE: 600; 310; 30; 20 INJECTION, SOLUTION INTRAVENOUS at 08:02

## 2024-07-14 RX ADMIN — CALCIUM CARBONATE (ANTACID) CHEW TAB 500 MG 1000 MG: 500 CHEW TAB at 00:35

## 2024-07-14 RX ADMIN — Medication 0.8 MCG/KG/MIN: at 07:53

## 2024-07-14 RX ADMIN — GENTAMICIN SULFATE 140 MG: 40 INJECTION, SOLUTION INTRAMUSCULAR; INTRAVENOUS at 16:02

## 2024-07-14 RX ADMIN — GABAPENTIN 100 MG: 100 CAPSULE ORAL at 14:00

## 2024-07-14 RX ADMIN — FENTANYL CITRATE 50 MCG: 50 INJECTION, SOLUTION INTRAMUSCULAR; INTRAVENOUS at 07:20

## 2024-07-14 RX ADMIN — ACETAMINOPHEN 975 MG: 325 TABLET, FILM COATED ORAL at 14:02

## 2024-07-14 RX ADMIN — LIDOCAINE HYDROCHLORIDE 5 ML: 20 INJECTION, SOLUTION EPIDURAL; INFILTRATION; INTRACAUDAL; PERINEURAL at 08:08

## 2024-07-14 RX ADMIN — HYDROXYZINE HYDROCHLORIDE 25 MG: 25 TABLET ORAL at 14:01

## 2024-07-14 RX ADMIN — GABAPENTIN 100 MG: 100 CAPSULE ORAL at 21:35

## 2024-07-14 RX ADMIN — LIDOCAINE 2 PATCH: 4 PATCH TOPICAL at 20:26

## 2024-07-14 RX ADMIN — KETOROLAC TROMETHAMINE 30 MG: 30 INJECTION, SOLUTION INTRAMUSCULAR at 09:12

## 2024-07-14 RX ADMIN — METRONIDAZOLE 500 MG: 500 INJECTION, SOLUTION INTRAVENOUS at 13:20

## 2024-07-14 RX ADMIN — HYDROXYZINE HYDROCHLORIDE 25 MG: 25 TABLET ORAL at 20:26

## 2024-07-14 RX ADMIN — SODIUM CHLORIDE, POTASSIUM CHLORIDE, SODIUM LACTATE AND CALCIUM CHLORIDE: 600; 310; 30; 20 INJECTION, SOLUTION INTRAVENOUS at 07:41

## 2024-07-14 RX ADMIN — CHLOROPROCAINE HYDROCHLORIDE 20 ML: 30 INJECTION, SOLUTION EPIDURAL; INFILTRATION; INTRACAUDAL; PERINEURAL at 07:36

## 2024-07-14 RX ADMIN — SENNOSIDES AND DOCUSATE SODIUM 1 TABLET: 50; 8.6 TABLET ORAL at 21:35

## 2024-07-14 RX ADMIN — METHYLERGONOVINE MALEATE 200 MCG: 0.2 INJECTION INTRAVENOUS at 08:39

## 2024-07-14 RX ADMIN — AZITHROMYCIN MONOHYDRATE 500 MG: 500 INJECTION, POWDER, LYOPHILIZED, FOR SOLUTION INTRAVENOUS at 07:31

## 2024-07-14 RX ADMIN — KETOROLAC TROMETHAMINE 30 MG: 30 INJECTION, SOLUTION INTRAMUSCULAR at 15:57

## 2024-07-14 RX ADMIN — LIDOCAINE HYDROCHLORIDE 5 ML: 20 INJECTION, SOLUTION EPIDURAL; INFILTRATION; INTRACAUDAL; PERINEURAL at 07:56

## 2024-07-14 ASSESSMENT — ACTIVITIES OF DAILY LIVING (ADL)
ADLS_ACUITY_SCORE: 18
ADLS_ACUITY_SCORE: 23
ADLS_ACUITY_SCORE: 18
ADLS_ACUITY_SCORE: 23
ADLS_ACUITY_SCORE: 18

## 2024-07-14 NOTE — PROVIDER NOTIFICATION
07/14/24 0410   Provider Notification   Provider Name/Title Dr. Motta   Method of Notification Phone   Request Evaluate - Remote   Notification Reason Status Update     This writer was attempting to replace IUPC. Resistance met when placing and IUPC kept coiling despite inserting in different areas, tube then filled with bright red blood. This writer removed IUPC, placed external TOCO on monitor and called MD to come and assess and attempt to place IUPC.

## 2024-07-14 NOTE — PLAN OF CARE
Orientated Pt and FOB to PP Unit routines and infant safety. IV and PO pain medications administered as ordered, as well as ice pack to incision-- pt states pain is manageable at this time. Incision CDI, postpartum checks WDL, Urine output adequate, clear yellow. Pt has been moving independently in bed and tolerating a regular diet. Plan to stand at bedside and remove snow catheter after next dose of pain medications. IV antibiotics administered without difficulty. VSS, afebrile.  FOB at bedside and is very supportive. Attentive to infant needs, attempting to breast feed every 2-3 hours.         Problem: Adult Inpatient Plan of Care  Goal: Optimal Comfort and Wellbeing  Outcome: Progressing  Intervention: Monitor Pain and Promote Comfort  Recent Flowsheet Documentation  Taken 7/14/2024 1557 by Sona Shaffer, RN  Pain Management Interventions:   medication (see MAR)   cold applied  Taken 7/14/2024 1430 by Sona Shaffer, RN  Pain Management Interventions:   medication (see MAR)   cold applied   rest     Problem: Adult Inpatient Plan of Care  Goal: Plan of Care Review  Description: The Plan of Care Review/Shift note should be completed every shift.  The Outcome Evaluation is a brief statement about your assessment that the patient is improving, declining, or no change.  This information will be displayed automatically on your shift  note.  Outcome: Progressing  Flowsheets (Taken 7/14/2024 4776)  Plan of Care Reviewed With:   patient   significant other  Overall Patient Progress: improving       Goal Outcome Evaluation:      Plan of Care Reviewed With: patient, significant other    Overall Patient Progress: improving

## 2024-07-14 NOTE — PROVIDER NOTIFICATION
07/13/24 2354   Provider Notification   Provider Name/Title Dr. Motta   Method of Notification Phone   Request Evaluate - Remote   Notification Reason Status Update     SVE remains unchanged. Contractions still every 1.5-2 minutes at 10 eric-units Pit and unable to increase. MVU's continue to be inadequate. Last temp 99.1 oral. FHT's overall cat 1. MD states we can either decrease Pit dose in half and give 300 ml LR blous and continue to labor or if pt is wanting to be done laboring and request C section. Will talk with pt about options and will let MD know what they decide.

## 2024-07-14 NOTE — PROVIDER NOTIFICATION
07/14/24 0015   Provider Notification   Provider Name/Title Dr. Motta   Method of Notification Electronic Page   Request Evaluate - Remote     Pt deciding to half the Pit to 5 eric-units and do 300 ml LR bolus before deciding on C section. MD aware of plan, states if contractions don't space out with halfing the Pit after 30-60 minutes to turn Pit off and restart. MD placed orders for Bendaryl and Tums.

## 2024-07-14 NOTE — PROGRESS NOTES
"[facility]  Labor Progress Note    S: Patient states she is not doing well.  She is feeling constant pressure and states: \"I just want her out of me!\"  She is asking for anything to make her comfortable.    O:   Patient Vitals for the past 4 hrs:   BP Temp Temp src   24 0722 -- 100  F (37.8  C) Axillary   24 0600 -- 98.4  F (36.9  C) Oral   24 0517 109/60 -- --   24 0430 -- 98.5  F (36.9  C) Oral   24 0416 109/59 -- --   24 0330 -- 99.1  F (37.3  C) Oral       SVE: Per Schlies; complete and 0 station  Gen: Patient is hyperventilating, holding her abdomen, crying and saying \"Oh F*CK\"    FHT: Baseline 170, moderate variability, accelerations absent, absent decelerations  [place]: Contractions every 2-4 minutes     A/P:  Ms. Sridevi Self is a 28 year old  at 40w6d, here initially for IOL secondary to class 2 obesity and fatty liver, now complete after protracted and dysfunctional labor course, persistent category II FHR and pain intolerance.    Labor:  -Upon admission, given the patient's unfavorable cervix, cervical ripening was initiated with oral misoprostol, she was given a total of 4 doses and fortunately made adequate cervical change from fingertip to 3 to 4 cm.  At that time, artificial rupture of membranes was completed that was notable for thick meconium fluid.  Given the difficulty with tracing both fetal heart rate, as well as her contractions, internal monitors were placed at this time.  After artificial rupture membranes, the patient did not have onset of regular contractions, at which time Pitocin augmentation was started at 1211 on 2024.  Regarding her labor course, the patient was again 3 cm at the time of rupture at 0919, she was 5-1/2 cm at 1418, then reached 8 cm at 1700.  Following the cervical exam where she was 8 cm, the patient was unchanged for approximately 10 hours until she reached 9 cm at 04 30 after the Pitocin was halved, " restarted, and she was given IV Benadryl for cervical swelling.  At this time, the patient was noted to have an overall reassuring category 2 fetal heart rate tracing and she was allowed for additional labor progression.  It was noted that she was complete at 0538 When she was complete and -1 station.  With the previous OB/GYN provider, the patient pushed for just over 1 hour and only had minimal descensus from -1 station to do 0 station as well as increasing It.  After pushing for this amount of time, the patient started to have an increase in pain intolerance, where it was difficult for her to focus on pushing given the constant pain that she was experiencing.  With both OB/GYN providers in the room, we reviewed her dysfunctional labor course, her current station, and that likely she would need several additional hours to experience a vaginal delivery, and this was if the category 2 fetal heart rate tracing remained the same, and did not decompensate further.  At that time, the patient stated that she did not have the energy or the ability to continue with pushing given the pressure she was experiencing, and desire to proceed with  section.  - The risks, benefits, and alternatives of  section were discussed, including the risks of bleeding, infection, injury to surrounding organs, fetal injury. She consented to a blood transfusion in the event of a life threatening amount of bleeding. She had time to ask questions and agreed to proceed. Surgical consent was signed.   - Pain: Epidural in place      FWB:   - Category II FHT    Prenatal Care:  - OB labs reviewed: O negative, Rubella immune, Heb B Ag non-reactive, HIV negative, RPR negative  - Genetics: NIPS, early US, AFP all normal  - Anatomy ultrasound: normal   - Rh negative, Rhogam given 24  -  (failed), passed GTT, Hgb 10.7, plts 333, Treponema NR  - Flu declined, Tdap 24, s/p Covid vaccine   - GBS neg  - Feed: breast, has  script  - Contraception: mini-pill while breastfeeding     Hx migraines  - Continue following with neuro    Hx Fatty Liver  - LFTs and baseline pre-e labs normal at NOB1.   - Admission pre-E labs ordered    Anxiety  - Mood stable, no SI/HI. No meds.    Debbie Zavala MD   Pager: [phone]   July 14, 2024

## 2024-07-14 NOTE — ANESTHESIA POSTPROCEDURE EVALUATION
Patient: Sridevi Self    Procedure: Procedure(s):   section       Anesthesia Type:  Epidural    Note:  Disposition: Inpatient   Postop Pain Control: Uneventful            Sign Out: Well controlled pain   PONV: No   Neuro/Psych: Uneventful            Sign Out: Acceptable/Baseline neuro status   Airway/Respiratory: Uneventful            Sign Out: Acceptable/Baseline resp. status   CV/Hemodynamics: Uneventful            Sign Out: Acceptable CV status   Other NRE: NONE   DID A NON-ROUTINE EVENT OCCUR? No           Last vitals:  Vitals:    24 0517 24 0600 24 0722   BP: 109/60     Resp:      Temp:  98.4  F (36.9  C) 100  F (37.8  C)   SpO2:          Electronically Signed By: Tono Lora MD  2024  9:34 AM

## 2024-07-14 NOTE — OP NOTE
Section Operative Note  Date: 2024      Patient: Sridevi Self   MRN: [id number]    Surgeon: Dr. Debbie Zavala  Assistant: Dr. Danica Steve     Pre-Op Diagnosis:   -  at 40w6d  - Fatty liver   - Class 2 obesity   - GBS negative   - Rh negative   - Category II FHR   - Pain intolerance in labor     Post-Op Diagnosis:   -  delivery at 40w6d   - Bilateral extensions into the broad ligaments with a through and through defect into the left broad ligament   - Hemorrhage secondary to extensions, uterine atony (TXA, methergine))   - Poor pain tolerance during surgical procedure    - Suspected triple-I     Procedure: Unscheduled primary low transverse  section via pfannenstiel skin incision with two layer uterine closure.     Anesthesia: Epidural with Durmorph  QBL: 1314cc  IVF: See anesthesia records  UOP: See anesthesia records    Complications: Bilateral extensions into the broad ligaments, PPH secondary to uterine atony    Specimens: Cord blood, placenta, cord gases     Indications: 28 year old admitted as a  at 40w6d for class 2 obesity and fatty liver. Upon admission, given the patient's unfavorable cervix, cervical ripening was initiated with oral misoprostol, she was given a total of 4 doses and fortunately made adequate cervical change from fingertip to 3 to 4 cm.  At that time, artificial rupture of membranes was completed that was notable for thick meconium fluid.  Given the difficulty with tracing both fetal heart rate, as well as her contractions, internal monitors were placed at this time.  After artificial rupture membranes, the patient did not have onset of regular contractions, at which time Pitocin augmentation was started at 1211 on 2024.  Regarding her labor course, the patient was again 3 cm at the time of rupture at 0919, she was 5-1/2 cm at 1418, then reached 8 cm at 1700.  Following the cervical exam where she was 8 cm, the patient was unchanged for  approximately 10 hours until she reached 9 cm at 04 30 after the Pitocin was halved, restarted, and she was given IV Benadryl for cervical swelling.  At this time, the patient was noted to have an overall reassuring category 2 fetal heart rate tracing and she was allowed for additional labor progression.  It was noted that she was complete at 0538 When she was complete and -1 station.  With the previous OB/GYN provider, the patient pushed for just over 1 hour and only had minimal descensus from -1 station to do 0 station as well as increasing It.  After pushing for this amount of time, the patient started to have an increase in pain intolerance, where it was difficult for her to focus on pushing given the constant pain that she was experiencing.  With both OB/GYN providers in the room, we reviewed her dysfunctional labor course, her current station, and that likely she would need several additional hours to experience a vaginal delivery, and this was if the category 2 fetal heart rate tracing remained the same, and did not decompensate further.  At that time, the patient stated that she did not have the energy or the ability to continue with pushing given the pressure she was experiencing, and desire to proceed with  section.  - The risks, benefits, and alternatives of  section were discussed, including the risks of bleeding, infection, injury to surrounding organs, fetal injury. She consented to a blood transfusion in the event of a life threatening amount of bleeding. She had time to ask questions and agreed to proceed. Surgical consent was signed.     Findings: Live-born female infant from the cephalic presentation born on 2024 at 0818, apgars 7 & 9, weight 3232 grams. Thick meconium stained amniotic fluid with thick appearance concerning for triple I. Placenta intact with a 3V cord.   Normal tubes and ovaries bilaterally.  Deep left hysterotomy extension into and through the broad ligament  on the left without extension into the cervix.  Right hysterotomy extension into the broad ligament. O'Liscomb sutures were placed bilaterally.   Given the extensions, I would recommend AGAINST TOLAC in the future.    Procedure: The patient was taken to the operating room where epidural anesthesia was initiated and found to be adequate.  She was placed in the dorsal supine position with a leftward tilt. A snow catheter was placed. She was then prepped and draped in the usual sterile fashion and a time out was performed. A pfannenstiel skin incision was made and carried down to the underlying fascia. The fascial incision was extended laterally. The fascia was grasped and elevated, and the superior and inferior aspects were dissected away from the rectus muscles with blunt and sharp dissection.  The rectus muscles were then  at the midline and the peritoneum was entered bluntly. The peritoneal incision was extended with cautery.  Valerio-O retractor was placed.  A low transverse uterine incision was then made with the scalpel and the incision was extended laterally by finger fraction. The infant was delivered atraumatically. The cord was clamped and cut and the infant was handed off to the waiting NICU team. The placenta was removed with gentle traction on the cord. The uterus was then exteriorized and cleared of all clots and debris. At this time, poor uterine tone was noted and Methergine was given.  Further inspection at this time revealed the left hysterotomy extension at which time in-house Dr. Steve kindly came into the OR for an experienced surgeon and the need for better visualization which was limited with bleeding and the patient's body habitus.     At this time, the apex of the left extension was visualized and using an 0-Vicryl suture, was closed in an running, locked fashion to the midline, suture was tied.  Then starting from the right apex, the hysterotomy was closed with a second 0-Vicryl in  a running, locked fashion and again tied in the midline.  At this time, ongoing bleeding for both apices were note and two O'leary sutures were placed using 0-Monocryl. A second imbricating layer was then placed using 0-Monocryl. The hysterotomy was noted to be hemostatic.  The posterior cul-de-sac was then cleared of all clots and debris. The uterus was replaced within the abdomen. Surgicel powder was applied. The hysterotomy remained hemostatic. The pericolic gutters were cleared. The fascia and rectus muscles were inspected and noted to be hemostatic. The fascial incision was then closed with 0-Vicryl in a running fashion. The subcutaneous tissues were irrigated and hemostasis achieved with the electrocautery  prior to re-approximation using 3-0 plain gut suture in two layers; the first was deep and running while the second was with horizontal mattress sutures. The skin was then closed with 3-0 Monocryl and covered with sterile fluid. .     All sponge, lap and needle counts were correct x 2. Ancef  and Azithromycin were given prior to skin incision as well as TXA. The patient was taken to the PACU in stable condition.     Debbie Zavala MD   Pager: [phone]   July 14, 2024

## 2024-07-14 NOTE — DISCHARGE SUMMARY
[facility] Discharge Summary    Sridevi Self MRN# [id number]   Age: 28 year old YOB: 1995     Date of Admission:  2024  Date of Discharge::  2024  Admitting Physician:  Debbie Sultana MD  Discharge Physician:  Christal Hobson MD            Admission Diagnoses:   Intrauterine pregnancy at 40w4d  - Fatty liver   - Class 2 obesity   - GBS negative   - Rh negative           Discharge Diagnosis:      delivery at 40w6d- Bilateral extensions into the broad ligaments with a through and through defect into the left broad ligament   - Hemorrhage secondary to extensions, uterine atony (TXA, methergine))   - Poor pain tolerance during surgical procedure    - Suspected triple-I   Acute blood loss anemia          Procedures:   Unscheduled primary low transverse  section via pfannenstiel skin incision with two layer uterine closure  Epidural            Medications Prior to Admission:     Medications Prior to Admission   Medication Sig Dispense Refill Last Dose    Magnesium Oxide -Mg Supplement 500 MG TABS Take 500 mg by mouth daily   2024    Prenatal Vit-Fe Fumarate-FA (PRENATAL MULTIVITAMIN  PLUS IRON) 27-1 MG TABS Take 1 tablet by mouth daily   2024    Riboflavin 100 MG TABS Take 400 mg by mouth daily   2024    fluticasone (FLONASE) 50 MCG/ACT nasal spray Spray 1 spray into both nostrils daily                Discharge Medications:        Review of your medicines        UNREVIEWED medicines. Ask your doctor about these medicines        Dose / Directions   fluticasone 50 MCG/ACT nasal spray  Commonly known as: FLONASE      Dose: 1 spray  Spray 1 spray into both nostrils daily  Refills: 0     Magnesium Oxide -Mg Supplement 500 MG Tabs      Dose: 500 mg  Take 500 mg by mouth daily  Refills: 0     prenatal multivitamin  plus iron 27-1 MG Tabs      Dose: 1 tablet  Take 1 tablet by mouth daily  Refills: 0     Riboflavin 100 MG Tabs      Dose: 400 mg  Take  400 mg by mouth daily  Refills: 0                     Consultations:   Lactation           Brief History of Labor or Admission:   Indications: 28 year old admitted as a  at 40w6d for class 2 obesity and fatty liver. Upon admission, given the patient's unfavorable cervix, cervical ripening was initiated with oral misoprostol, she was given a total of 4 doses and fortunately made adequate cervical change from fingertip to 3 to 4 cm.  At that time, artificial rupture of membranes was completed that was notable for thick meconium fluid.  Given the difficulty with tracing both fetal heart rate, as well as her contractions, internal monitors were placed at this time.  After artificial rupture membranes, the patient did not have onset of regular contractions, at which time Pitocin augmentation was started at 1211 on 2024.  Regarding her labor course, the patient was again 3 cm at the time of rupture at 0919, she was 5-1/2 cm at 1418, then reached 8 cm at 1700.  Following the cervical exam where she was 8 cm, the patient was unchanged for approximately 10 hours until she reached 9 cm at 04 30 after the Pitocin was halved, restarted, and she was given IV Benadryl for cervical swelling.  At this time, the patient was noted to have an overall reassuring category 2 fetal heart rate tracing and she was allowed for additional labor progression.  It was noted that she was complete at 0538 When she was complete and -1 station.  With the previous OB/GYN provider, the patient pushed for just over 1 hour and only had minimal descensus from -1 station to do 0 station as well as increasing It.  After pushing for this amount of time, the patient started to have an increase in pain intolerance, where it was difficult for her to focus on pushing given the constant pain that she was experiencing.  With both OB/GYN providers in the room, we reviewed her dysfunctional labor course, her current station, and that likely she would need  several additional hours to experience a vaginal delivery, and this was if the category 2 fetal heart rate tracing remained the same, and did not decompensate further.  At that time, the patient stated that she did not have the energy or the ability to continue with pushing given the pressure she was experiencing, and desire to proceed with  section.  - The risks, benefits, and alternatives of  section were discussed, including the risks of bleeding, infection, injury to surrounding organs, fetal injury. She consented to a blood transfusion in the event of a life threatening amount of bleeding. She had time to ask questions and agreed to proceed. Surgical consent was signed.      Findings: Live-born female infant from the cephalic presentation born on 2024 at 0818, apgars 7 & 9, weight 3232 grams. Thick meconium stained amniotic fluid with thick appearance concerning for triple I. Placenta intact with a 3V cord.   Normal tubes and ovaries bilaterally.  Deep left hysterotomy extension into and through the broad ligament on the left without extension into the cervix.  Right hysterotomy extension into the broad ligament. O'[place] sutures were placed bilaterally.       Delivery Summary    Sridevi Self MRN# [id number]   Age: 28 year old YOB: 1995          Aramis, Female-Sridevi [[id number]]      Labor Event Times      Latent labor onset date/time: 2024 0919    Active labor onset date: 24 Onset time:  2:18 PM   Dilation complete date: 24 Complete time:  5:38 AM   Start pushing date/time: 2024 0545          Labor Length      1st Stage (hrs): 15 (min): 20   2nd Stage (hrs): 2 (min): 40   3rd Stage (hrs): 0 (min): 3          Labor Events     labor?: No   steroids: None  Labor Type: Induction/Cervical ripening  Predominate monitoring during 1st stage: continuous electronic fetal monitoring     Antibiotics received during labor?: No       Rupture  date/time: 24 1149   Rupture type: Artificial Rupture of Membranes  Fluid color: Meconium     Induction: Misoprostol  Induction date/time:      Cervical ripening date/time: 24 2200    Indications for induction: Post-term Gestation, Obesity     Augmentation: Oxytocin, AROM  Indications for augmentation: Ineffective Contraction Pattern       Delivery/Placenta Date and Time      Delivery Date: 24 Delivery Time:  8:18 AM   Placenta Date/Time: 2024  8:21 AM  Oxytocin given at the time of delivery: after delivery of baby  Delivering clinician: Debbie Sultana MD              Vaginal Counts              Needles Suture Needles Sponges (RETIRED) Instruments   Initial counts       Added to count       Relief counts       Final counts               Placed during labor Accounted for at the end of labor   FSE Yes    IUPC Yes    Cervidil No                              Apgars    Living status: Living   1 Minute 5 Minute 10 Minute 15 Minute 20 Minute   Skin color: 0  1       Heart rate: 2  2       Reflex irritability: 2  2       Muscle tone: 2  2       Respiratory effort: 1  2       Total: 7  9       Apgars assigned by: ROSALES MANE APRN CNP       Cord      Vessels: 3 Vessels    Cord Complications: None               Delayed cord clamping?: Yes    Cord Clamping Delay (seconds): 1-30 seconds    Stem cell collection?: No           [place] Resuscitation    Methods: None  [place] Care at Delivery: Asked by Dr. Sultana to attend the  delivery of this term, female infant with a gestational age of 40 6/7 weeks secondary to meconium stained amniotic fluid.      30 seconds of delayed cord clamping were completed. The infant was stimulated with good tone during delayed cord clamping. Cord clamped at 30 seconds due to apnea.  The infant was placed on a warmer, dried and stimulated. Loud lusty cry. Mouth and nose deep suctioned for thick meconium stained secretions. Gross PE is WNL.  Infant required no further  "resuscitation.  Infant was shown to mother and father, handoff to nursery nurse and will be transferred to the NBN for further care.    Chiara Bynum APRN CNP 2024 8:47 AM               Measurements      Weight: 7 lb 2 oz Length: 1' 9\"     Head circumference: 31.8 cm           Labor Events and Shoulder Dystocia    Fetal Tracing Prior to Delivery: Category 2  Shoulder dystocia present?: Neg       Delivery (Maternal) (Provider to Complete) ([id number])    Episiotomy: None  Perineal lacerations: None    Repair suture: None       Blood Loss  Mother: Sly Self #[id number]     Start of Mother's Information      Delivery Blood Loss  24 1418 - 24 0933      Total Surgical QBL Blood Loss (mL) Hospital Encounter 1314 mL    Total  1314 mL               End of Mother's Information  Mother: Sly Self #[id number]                Delivery - Provider to Complete ([id number])    Delivering clinician: Debbie Sultnaa MD  Delivery Type (Choose the 1 that will go to the Birth History): , Low Transverse                          Priority: Urgent      Specifics: Primary nulliparous     Indications for : Failed induction, Maternal indication, Fetal intolerance                       Placenta    Date/Time: 2024  8:21 AM  Removal: Expressed  Disposition: Pathology             Anesthesia    Method: Epidural, INTRAVENOUS , Nitrous Oxide  Cervical dilation at placement: 0-3                    Presentation and Position    Presentation: Vertex                         Post-op Course:   The patient's post-op course was unremarkable.  She recovered as anticipated and experienced no post-operative complications.  On discharge, her pain was well controlled. Vaginal bleeding is similar to peak menstrual flow.  Voiding without difficulty.  Ambulating well and tolerating a normal diet.  No fever or significant wound drainage.  Breastfeeding well.  Infant is stable. She was discharged on " post-partum day #3.  She desired POPs for birth control.    Post-partum hemoglobin:   Hemoglobin   Date Value Ref Range Status   07/12/2024 12.2 11.7 - 15.7 g/dL Final             Discharge Instructions and Follow-Up:     Discharge diet: Regular   Discharge activity: No heavy lifting more than 20 pounds, pushing, pulling for 6 week(s)  No driving or operating machinery while on narcotic analgesics  Pelvic rest: abstain from intercourse and do not use tampons for 6 week(s)   Discharge follow-up: Routine postpartum visit in 6 weeks     Wound care: Keep wound clean and dry  May shower but do not soak incision or scrub  Steri-strips off in 3-4 days           Discharge Disposition:     Discharged to home      Christal Hobson MD on 7/17/2024 at 10:44 AM

## 2024-07-14 NOTE — PROVIDER NOTIFICATION
07/13/24 2125   Provider Notification   Provider Name/Title Dr. Motta   Method of Notification Phone   Request Evaluate - Remote   Notification Reason Status Update     Pt tearful and not tolerating pressure at this time despite position changes and pushing bolus button on epidural. Dr. Zuleta currently at bedside for rebolus. SVE 8.5/95/0, has been 8 cm since 1700. MVU's not adequate but shahram every 1-2 minutes, contractions did space out slightly so Pit was increased to 10 eric-units. If pt gets comfortable after rebolus then recheck cervix in 2 hours unless indicated sooner, if pt is not getting comfortable Dr. Motta will come to bedside at talk with pt.

## 2024-07-14 NOTE — PROGRESS NOTES
"[facility]   OB PROGRESS NOTE     S. Pt states she is doing well overall. Has been able to get some rest and better pain control with the epidural rebolus. +FM    /59   Temp 99.1  F (37.3  C) (Oral)   Resp 16   Ht 1.549 m (5' 1\")   Wt 93 kg (205 lb)   SpO2 94%   BMI 38.73 kg/m      Fetal Heart Tones: 170 baseline, minimal variability, moderate variablility, + accels, and no decels  TOCO: frequency q 2 minutes  Cervical Exam: / Anterior/ soft/ -1     A/P Sridevi Self is a 28 year old  at 40w6d here for induction for class 2 obesity and fatty liver disease       1- labor induction  - s/p 4 doses of oral miso, AROM, and pitocin    - has FSE and IUPC in placed (replaced now due to not picking up contractions and minimal change in amplitude)   -discussed with patient and partner that her labor pattern has been dysfunctional with her being 8cm since 5pm on  (almost 12 hours) but contractions have not been adequate since 6:30pm (215 mvu). We have tried stopping picotin with fluid bolus and IV benadryl. On exam I could stretch her to 9cm when IUPC was replaced. I recommended we labor for another 60 minutes and then reassess to see if cervix is making change and/or her contractions become adequate. I did review that if she cannot progress to 10cm and has stalled at 8-9 cm over 12 hours, I would recommend a . She is agreeable to reassess in 1 hour and then decide if  is needed.   -prior to attempting IUPC replacement, FHT baseline has been 145-155 with minimal to moderate variability with accelerations without decelerations (unless on her back for a cervical check).          Prenatal Care:  - OB labs reviewed: O negative, Rubella immune, Heb B Ag non-reactive, HIV negative, RPR negative  - Genetics: NIPS, early US, AFP all normal  - Anatomy ultrasound: normal   - Rh negative, Rhogam given 24  -  (failed), passed GTT, Hgb 10.7, plts 333, Treponema NR  - Flu " declined, Tdap 4/16/24, s/p Covid vaccine   - GBS neg  - Feed: breast, has script  - Contraception: mini-pill while breastfeeding     Hx migraines  - Continue following with neuro    Hx Fatty Liver  - LFTs and baseline pre-e labs normal at NOB1.   - Admission pre-E labs ordered    Anxiety  - Mood stable, no SI/HI. No meds.    Dr. Georgina Motta DO

## 2024-07-14 NOTE — ANESTHESIA CARE TRANSFER NOTE
Patient: Sridevi Self    Procedure: Procedure(s):   section       Diagnosis: 40 weeks gestation of pregnancy [Z3A.40]  Diagnosis Additional Information: No value filed.    Anesthesia Type:   Epidural     Note:    Oropharynx: spontaneously breathing  Level of Consciousness: awake  Oxygen Supplementation: room air    Independent Airway: airway patency satisfactory and stable  Dentition: dentition unchanged  Vital Signs Stable: post-procedure vital signs reviewed and stable  Report to RN Given: handoff report given  Patient transferred to: Labor and Delivery  Comments: To L/D, report to RN.        Vitals:  Vitals Value Taken Time   BP     Temp     Pulse     Resp     SpO2         Electronically Signed By: JAMAR Dasilva CRNA  2024  9:22 AM

## 2024-07-14 NOTE — PLAN OF CARE
Problem: Adult Inpatient Plan of Care  Goal: Plan of Care Review  Description: The Plan of Care Review/Shift note should be completed every shift.  The Outcome Evaluation is a brief statement about your assessment that the patient is improving, declining, or no change.  This information will be displayed automatically on your shift  note.  Outcome: Progressing  Flowsheets (Taken 7/14/2024 1008)  Plan of Care Reviewed With: patient  Overall Patient Progress: improving  Goal: Absence of Hospital-Acquired Illness or Injury  Intervention: Prevent Skin Injury  Recent Flowsheet Documentation  Taken 7/14/2024 0930 by Karon Vallejo RN  Body Position: position changed independently     Problem: Labor  Goal: Stable Fetal Wellbeing  Intervention: Promote and Monitor Fetal Wellbeing  Recent Flowsheet Documentation  Taken 7/14/2024 0930 by Karon Vallejo RN  Body Position: position changed independently   Goal Outcome Evaluation:      Plan of Care Reviewed With: patient    Overall Patient Progress: improvingOverall Patient Progress: improving

## 2024-07-15 LAB
BASOPHILS # BLD AUTO: 0 10E3/UL (ref 0–0.2)
BASOPHILS # BLD AUTO: 0.1 10E3/UL (ref 0–0.2)
BASOPHILS NFR BLD AUTO: 0 %
BASOPHILS NFR BLD AUTO: 0 %
EOSINOPHIL # BLD AUTO: 0.3 10E3/UL (ref 0–0.7)
EOSINOPHIL # BLD AUTO: 0.3 10E3/UL (ref 0–0.7)
EOSINOPHIL NFR BLD AUTO: 1 %
EOSINOPHIL NFR BLD AUTO: 2 %
ERYTHROCYTE [DISTWIDTH] IN BLOOD BY AUTOMATED COUNT: 15.9 % (ref 10–15)
ERYTHROCYTE [DISTWIDTH] IN BLOOD BY AUTOMATED COUNT: 16.3 % (ref 10–15)
HCT VFR BLD AUTO: 30 % (ref 35–47)
HCT VFR BLD AUTO: 30.8 % (ref 35–47)
HGB BLD-MCNC: 10 G/DL (ref 11.7–15.7)
HGB BLD-MCNC: 9.9 G/DL (ref 11.7–15.7)
IMM GRANULOCYTES # BLD: 0.2 10E3/UL
IMM GRANULOCYTES # BLD: 0.3 10E3/UL
IMM GRANULOCYTES NFR BLD: 1 %
IMM GRANULOCYTES NFR BLD: 2 %
LYMPHOCYTES # BLD AUTO: 2.6 10E3/UL (ref 0.8–5.3)
LYMPHOCYTES # BLD AUTO: 2.8 10E3/UL (ref 0.8–5.3)
LYMPHOCYTES NFR BLD AUTO: 14 %
LYMPHOCYTES NFR BLD AUTO: 15 %
MCH RBC QN AUTO: 29.9 PG (ref 26.5–33)
MCH RBC QN AUTO: 29.9 PG (ref 26.5–33)
MCHC RBC AUTO-ENTMCNC: 32.5 G/DL (ref 31.5–36.5)
MCHC RBC AUTO-ENTMCNC: 33 G/DL (ref 31.5–36.5)
MCV RBC AUTO: 91 FL (ref 78–100)
MCV RBC AUTO: 92 FL (ref 78–100)
MONOCYTES # BLD AUTO: 0.7 10E3/UL (ref 0–1.3)
MONOCYTES # BLD AUTO: 1 10E3/UL (ref 0–1.3)
MONOCYTES NFR BLD AUTO: 4 %
MONOCYTES NFR BLD AUTO: 5 %
NEUTROPHILS # BLD AUTO: 13.8 10E3/UL (ref 1.6–8.3)
NEUTROPHILS # BLD AUTO: 14.3 10E3/UL (ref 1.6–8.3)
NEUTROPHILS NFR BLD AUTO: 77 %
NEUTROPHILS NFR BLD AUTO: 78 %
NRBC # BLD AUTO: 0 10E3/UL
NRBC # BLD AUTO: 0 10E3/UL
NRBC BLD AUTO-RTO: 0 /100
NRBC BLD AUTO-RTO: 0 /100
PLATELET # BLD AUTO: 194 10E3/UL (ref 150–450)
PLATELET # BLD AUTO: 226 10E3/UL (ref 150–450)
RBC # BLD AUTO: 3.31 10E6/UL (ref 3.8–5.2)
RBC # BLD AUTO: 3.35 10E6/UL (ref 3.8–5.2)
WBC # BLD AUTO: 17.9 10E3/UL (ref 4–11)
WBC # BLD AUTO: 18.4 10E3/UL (ref 4–11)

## 2024-07-15 PROCEDURE — 120N000013 HC R&B IMCU

## 2024-07-15 PROCEDURE — 250N000013 HC RX MED GY IP 250 OP 250 PS 637: Performed by: OBSTETRICS & GYNECOLOGY

## 2024-07-15 PROCEDURE — 36415 COLL VENOUS BLD VENIPUNCTURE: CPT | Performed by: OBSTETRICS & GYNECOLOGY

## 2024-07-15 PROCEDURE — 250N000011 HC RX IP 250 OP 636: Performed by: OBSTETRICS & GYNECOLOGY

## 2024-07-15 PROCEDURE — 999N000080 HC STATISTIC IP LACTATION SERVICES 16-30 MIN

## 2024-07-15 PROCEDURE — 85025 COMPLETE CBC W/AUTO DIFF WBC: CPT | Performed by: OBSTETRICS & GYNECOLOGY

## 2024-07-15 RX ORDER — HYDROXYZINE HYDROCHLORIDE 50 MG/1
50 TABLET, FILM COATED ORAL EVERY 6 HOURS PRN
Status: DISCONTINUED | OUTPATIENT
Start: 2024-07-15 | End: 2024-07-17 | Stop reason: HOSPADM

## 2024-07-15 RX ORDER — HYDROXYZINE HYDROCHLORIDE 25 MG/1
25 TABLET, FILM COATED ORAL EVERY 6 HOURS PRN
Status: DISCONTINUED | OUTPATIENT
Start: 2024-07-15 | End: 2024-07-17 | Stop reason: HOSPADM

## 2024-07-15 RX ADMIN — OXYCODONE HYDROCHLORIDE 5 MG: 5 TABLET ORAL at 23:59

## 2024-07-15 RX ADMIN — HYDROXYZINE HYDROCHLORIDE 25 MG: 25 TABLET ORAL at 02:19

## 2024-07-15 RX ADMIN — ACETAMINOPHEN 975 MG: 325 TABLET, FILM COATED ORAL at 21:21

## 2024-07-15 RX ADMIN — ACETAMINOPHEN 975 MG: 325 TABLET, FILM COATED ORAL at 02:19

## 2024-07-15 RX ADMIN — OXYCODONE HYDROCHLORIDE 5 MG: 5 TABLET ORAL at 15:14

## 2024-07-15 RX ADMIN — HYDROXYZINE HYDROCHLORIDE 25 MG: 25 TABLET ORAL at 08:27

## 2024-07-15 RX ADMIN — IBUPROFEN 800 MG: 800 TABLET, FILM COATED ORAL at 19:59

## 2024-07-15 RX ADMIN — IBUPROFEN 800 MG: 800 TABLET, FILM COATED ORAL at 12:02

## 2024-07-15 RX ADMIN — SIMETHICONE 80 MG: 80 TABLET, CHEWABLE ORAL at 15:14

## 2024-07-15 RX ADMIN — SENNOSIDES AND DOCUSATE SODIUM 1 TABLET: 50; 8.6 TABLET ORAL at 08:28

## 2024-07-15 RX ADMIN — SIMETHICONE 80 MG: 80 TABLET, CHEWABLE ORAL at 06:53

## 2024-07-15 RX ADMIN — ENOXAPARIN SODIUM 40 MG: 40 INJECTION SUBCUTANEOUS at 21:21

## 2024-07-15 RX ADMIN — KETOROLAC TROMETHAMINE 30 MG: 30 INJECTION, SOLUTION INTRAMUSCULAR at 04:47

## 2024-07-15 RX ADMIN — ACETAMINOPHEN 975 MG: 325 TABLET, FILM COATED ORAL at 14:59

## 2024-07-15 RX ADMIN — OXYCODONE HYDROCHLORIDE 5 MG: 5 TABLET ORAL at 20:03

## 2024-07-15 RX ADMIN — GABAPENTIN 100 MG: 100 CAPSULE ORAL at 08:27

## 2024-07-15 RX ADMIN — Medication 1 TABLET: at 12:02

## 2024-07-15 RX ADMIN — GABAPENTIN 100 MG: 100 CAPSULE ORAL at 20:00

## 2024-07-15 RX ADMIN — ACETAMINOPHEN 975 MG: 325 TABLET, FILM COATED ORAL at 08:27

## 2024-07-15 RX ADMIN — SENNOSIDES AND DOCUSATE SODIUM 2 TABLET: 50; 8.6 TABLET ORAL at 19:59

## 2024-07-15 RX ADMIN — LIDOCAINE 2 PATCH: 4 PATCH TOPICAL at 20:00

## 2024-07-15 ASSESSMENT — ACTIVITIES OF DAILY LIVING (ADL)
ADLS_ACUITY_SCORE: 19
ADLS_ACUITY_SCORE: 23
ADLS_ACUITY_SCORE: 19
ADLS_ACUITY_SCORE: 19
ADLS_ACUITY_SCORE: 23
ADLS_ACUITY_SCORE: 19
ADLS_ACUITY_SCORE: 19
ADLS_ACUITY_SCORE: 20
ADLS_ACUITY_SCORE: 23
ADLS_ACUITY_SCORE: 19
ADLS_ACUITY_SCORE: 20
ADLS_ACUITY_SCORE: 19
ADLS_ACUITY_SCORE: 19
ADLS_ACUITY_SCORE: 20
ADLS_ACUITY_SCORE: 19
ADLS_ACUITY_SCORE: 19
ADLS_ACUITY_SCORE: 20
ADLS_ACUITY_SCORE: 19
ADLS_ACUITY_SCORE: 23
ADLS_ACUITY_SCORE: 19
ADLS_ACUITY_SCORE: 20
ADLS_ACUITY_SCORE: 19
ADLS_ACUITY_SCORE: 19

## 2024-07-15 NOTE — LACTATION NOTE
Lactation in to see patient. First baby for Sly. Baby at breast at time of visit. Sly very tired not sure she retained breastfeeding education, may need to be reviewed again. Baby girl cueing, not able to maintain latch without shield. Baby does slip to to tip of nipple and does not make a seal. With shield baby is tongue thrusting and pushing nipple out of mouth with shield. Very uncoordinated. Baby biting down and pushing writers gloved finger out with her tongue. Pacifier brought in to try to get baby to do some suck training. Plan to continue to continue to supplement with donor milk and start to pump. Attempted to bottle feed baby. Showed side lying. Baby not coordinated and not able to transfer volume. Discussed findings with Dr. Jackson and ot consult placed. Will attempt to get Sly pumping.    In to initiate pumping Sly sleeping at that time. Will try again.    Assisted with breastfeed. Baby showing no cues, no latch. Moved to bottle with Melva bottle given by OT. Baby tongue thrusting and chewing on bottle with one swallow heard despite chin, cheek support. Pooling milk at side of cheek and letting milk dribble out of mouth.  Blood sugar done 65. Encouraged lots of STS. Drops of colostrum placed in infants mouth. Will discuss with bedside rn.

## 2024-07-15 NOTE — PLAN OF CARE
"Goal Outcome Evaluation:      Plan of Care Reviewed With: patient    Overall Patient Progress: improving    Vital signs stable. Postpartum assessment WDL. Pt drowsy majority of morning and afternoon, scheduled Atarax switched to prn. Pt alert this evening. Uterine fundus is firm and midline. Scant vaginal bleeding. Pt reporting moderate pain during shift. PRN oxycodone given x1, utilizing abd binder and ice packs. Incision assessment WDL. Encouraged to ambulate during shift. Voiding w/o difficulty. Tolerating a regular diet. Initiated pumping today. Questions/concerns addressed. Spouse at bedside, supportive.       Problem: Adult Inpatient Plan of Care  Goal: Plan of Care Review  Description: The Plan of Care Review/Shift note should be completed every shift.  The Outcome Evaluation is a brief statement about your assessment that the patient is improving, declining, or no change.  This information will be displayed automatically on your shift  note.  Outcome: Progressing  Flowsheets (Taken 7/15/2024 6084)  Plan of Care Reviewed With: patient  Overall Patient Progress: improving  Goal: Patient-Specific Goal (Individualized)  Description: You can add care plan individualizations to a care plan. Examples of Individualization might be:  \"Parent requests to be called daily at 9am for status\", \"I have a hard time hearing out of my right ear\", or \"Do not touch me to wake me up as it startles  me\".  Outcome: Progressing  Goal: Absence of Hospital-Acquired Illness or Injury  Outcome: Progressing  Intervention: Prevent Skin Injury  Recent Flowsheet Documentation  Taken 7/15/2024 1720 by Estella Bill, RN  Body Position: position changed independently  Taken 7/15/2024 0827 by Estella Bill, RN  Body Position: position changed independently  Goal: Optimal Comfort and Wellbeing  Outcome: Progressing  Intervention: Monitor Pain and Promote Comfort  Recent Flowsheet Documentation  Taken 7/15/2024 1514 by Estella Bill, " RN  Pain Management Interventions:   medication (see MAR)   cold applied  Taken 7/15/2024 0827 by Estella Bill RN  Pain Management Interventions:   medication (see MAR)   cold applied  Intervention: Provide Person-Centered Care  Recent Flowsheet Documentation  Taken 7/15/2024 1720 by Estella Bill RN  Trust Relationship/Rapport:   care explained   choices provided   questions answered   questions encouraged  Taken 7/15/2024 0827 by Estella Bill RN  Trust Relationship/Rapport:   care explained   choices provided   questions answered   questions encouraged  Goal: Readiness for Transition of Care  Outcome: Progressing     Problem: Labor  Goal: Hemostasis  Outcome: Progressing  Goal: Stable Fetal Wellbeing  Outcome: Progressing  Intervention: Promote and Monitor Fetal Wellbeing  Recent Flowsheet Documentation  Taken 7/15/2024 1720 by Estella Bill RN  Body Position: position changed independently  Taken 7/15/2024 0827 by Estella Bill RN  Body Position: position changed independently  Goal: Effective Progression to Delivery  Outcome: Progressing  Goal: Absence of Infection Signs and Symptoms  Outcome: Progressing  Goal: Acceptable Pain Control  Outcome: Progressing  Goal: Normal Uterine Contraction Pattern  Outcome: Progressing     Problem: Fall Injury Risk  Goal: Absence of Fall and Fall-Related Injury  Outcome: Progressing

## 2024-07-15 NOTE — PROGRESS NOTES
Public Health Nurse (PHN) in to see patient to discuss [facility] ([facility]) programs. Patient is not interested in referral for a nurse visit at this time but will reach out to [facility] if interested in scheduling a nurse visit. PHN discussed [facility] community resource guide and rack cards and left these resources with patient.

## 2024-07-15 NOTE — PROVIDER NOTIFICATION
"   07/15/24 1348   Provider Notification   Provider Name/Title Dr. Kraus   Method of Notification Electronic Page   Request Evaluate-Remote   Notification Reason Status Update       wondering if we could switch pt's scheduled atarax to a prn? Pt has been very drowsy/sleeping all day.    Provider response: \"Yes. Please make it PRN! Thanks\"  "

## 2024-07-15 NOTE — PLAN OF CARE
Vital signs stable. Postpartum assessment WDL. Uterine fundus is firm and midline. Scant vaginal bleeding. Using Tylenol, toradol, lidocaine patches and gabapentin for pain. Some relief with medications, pt states she does have some anxiety with pain. Incision assessment WDL, ice pack applied to incision. Up and ambulated in peralta this AM. Watts removed overnight, now voiding w/o difficulty. Tolerating a regular diet, put experiencing gas pain. Simethicone given this AM. Breast fed with a shield twice overnight. Hand expressed into spoon to finger feed baby. Pt requested donor milk due to exhaustion. Will start pumping this morning if plan to continue donor milk. Bonding well with , significant other at bedside and supportive. Questions/concerns addressed.      Problem: Adult Inpatient Plan of Care  Goal: Plan of Care Review  Description: The Plan of Care Review/Shift note should be completed every shift.  The Outcome Evaluation is a brief statement about your assessment that the patient is improving, declining, or no change.  This information will be displayed automatically on your shift  note.  Outcome: Progressing  Flowsheets (Taken 7/15/2024 0650)  Plan of Care Reviewed With: patient  Overall Patient Progress: improving  Goal: Absence of Hospital-Acquired Illness or Injury  Outcome: Progressing  Intervention: Prevent Skin Injury  Recent Flowsheet Documentation  Taken 7/15/2024 0445 by Elise Hughes, RN  Body Position: position changed independently  Taken 2024 by Elise Hughes, RN  Body Position: position changed independently  Goal: Optimal Comfort and Wellbeing  Outcome: Progressing  Intervention: Provide Person-Centered Care  Recent Flowsheet Documentation  Taken 7/15/2024 044 by Elise Hughes, RN  Trust Relationship/Rapport:   care explained   choices provided   questions answered   questions encouraged   emotional support provided   thoughts/feelings acknowledged   reassurance  provided  Taken 7/14/2024 2021 by Elise Hughes, RN  Trust Relationship/Rapport:   care explained   choices provided   questions answered   questions encouraged   emotional support provided   thoughts/feelings acknowledged   reassurance provided  Goal: Readiness for Transition of Care  Outcome: Progressing     Problem: Labor  Goal: Hemostasis  Outcome: Progressing  Goal: Stable Fetal Wellbeing  Outcome: Progressing  Intervention: Promote and Monitor Fetal Wellbeing  Recent Flowsheet Documentation  Taken 7/15/2024 0445 by Elise Hughes, RN  Body Position: position changed independently  Taken 7/14/2024 2021 by Elise Hughes RN  Body Position: position changed independently  Goal: Effective Progression to Delivery  Outcome: Progressing  Goal: Absence of Infection Signs and Symptoms  Outcome: Progressing  Goal: Acceptable Pain Control  Outcome: Progressing  Goal: Normal Uterine Contraction Pattern  Outcome: Progressing     Problem: Fall Injury Risk  Goal: Absence of Fall and Fall-Related Injury  Outcome: Progressing   Goal Outcome Evaluation:      Plan of Care Reviewed With: patient    Overall Patient Progress: improvingOverall Patient Progress: improving

## 2024-07-15 NOTE — PROGRESS NOTES
Patient Name:  Sridevi Self   MRN:  [id number]  Age:  28 year old    YOB: 1995      POSTPARTUM/POST-OPERATIVE PROGRESS NOTE    Pt is POD#1 s/p C/S.  She is doing well without complaints.  Pt is ambulating and tolerating a regular diet.  Watts is out and she is voiding without complication.  Pain is well controlled with PO medication and lochia is within normal limits.  She is breastfeeding.  Baby is doing well.    She denies any dizziness, lightheadedness, palpitations, SOB, CP.        Objective:    Temp:  [97.6  F (36.4  C)-98.7  F (37.1  C)] 97.6  F (36.4  C)  Pulse:  [70-91] 89  Resp:  [16-18] 18  BP: (107-136)/(67-88) 119/81  SpO2:  [90 %-100 %] 98 %  205 lbs 0 oz      General Appearance:  NAD, A&O x 3, comfortable  Lungs:  unlabored  Cardiovascular:  RRR  Abdomen:  soft, minimally distended; appropriately tender near incision; no rebound or guarding  Fundus:  firm, below the umbilicus  Incision:  clean, dry and intact  Lower extremities:  no significant edema      Lab Review:    ABO/RH(D)   Date Value Ref Range Status   2024 O NEG  Final     Hemoglobin   Date Value Ref Range Status   07/15/2024 9.9 (L) 11.7 - 15.7 g/dL Final   2024 12.1 11.7 - 15.7 g/dL Final   2024 12.2 11.7 - 15.7 g/dL Final     Hematocrit   Date Value Ref Range Status   07/15/2024 30.0 (L) 35.0 - 47.0 % Final   2024 36.3 35.0 - 47.0 % Final   2024 36.6 35.0 - 47.0 % Final           Assessment:  29yo  POD#1 s/p unscheduled  for cat II FHT and pain intolerance in labor, doing well.      Plan:  - Post-op: recovering well.  Cont PO regular diet.  - Acute on chronic Anemia; ABLA (PPH): Asymptomatic.  Hgb 12.1>9.9.  Will start PO iron.   - Pain control: Cont PO pain meds (Tylenol, Gabapentin, Ibuprofen) and lidocaine patches  - Bilateral extensions at time of PCS:  Reviewed events of PCS.  Recommended RCS for future pregnancies. She agrees, and declines TOLAC.  She desires 2-3 kids  max.   - h/o Fatty Liver: no lab abnormalities antenatally  - Anx: mood stable, no SI/HI.  No meds.    - DVT Ppx:  Lovenox while in hospital.  Encourage ambulation.  - Contraception: POPs  - Dispo: anticipate DC POD #3 per patient request      Meredith Chan, MD [facility]  July 15, 2024

## 2024-07-16 PROCEDURE — 250N000013 HC RX MED GY IP 250 OP 250 PS 637: Performed by: OBSTETRICS & GYNECOLOGY

## 2024-07-16 PROCEDURE — 120N000013 HC R&B IMCU

## 2024-07-16 PROCEDURE — 250N000011 HC RX IP 250 OP 636: Performed by: OBSTETRICS & GYNECOLOGY

## 2024-07-16 RX ORDER — MODIFIED LANOLIN
OINTMENT (GRAM) TOPICAL
Qty: 7 G | Refills: 3 | Status: SHIPPED | OUTPATIENT
Start: 2024-07-16

## 2024-07-16 RX ORDER — OXYCODONE HYDROCHLORIDE 5 MG/1
5 TABLET ORAL EVERY 6 HOURS PRN
Qty: 12 TABLET | Refills: 0 | Status: SHIPPED | OUTPATIENT
Start: 2024-07-16

## 2024-07-16 RX ORDER — ACETAMINOPHEN 325 MG/1
975 TABLET ORAL EVERY 6 HOURS
Qty: 60 TABLET | Refills: 1 | Status: SHIPPED | OUTPATIENT
Start: 2024-07-16

## 2024-07-16 RX ORDER — AMOXICILLIN 250 MG
1 CAPSULE ORAL 2 TIMES DAILY
Qty: 40 TABLET | Refills: 0 | Status: SHIPPED | OUTPATIENT
Start: 2024-07-16

## 2024-07-16 RX ORDER — IBUPROFEN 800 MG/1
800 TABLET, FILM COATED ORAL EVERY 6 HOURS
Qty: 40 TABLET | Refills: 1 | Status: SHIPPED | OUTPATIENT
Start: 2024-07-16

## 2024-07-16 RX ADMIN — SENNOSIDES AND DOCUSATE SODIUM 2 TABLET: 50; 8.6 TABLET ORAL at 20:43

## 2024-07-16 RX ADMIN — LIDOCAINE 2 PATCH: 4 PATCH TOPICAL at 20:44

## 2024-07-16 RX ADMIN — ACETAMINOPHEN 975 MG: 325 TABLET, FILM COATED ORAL at 21:47

## 2024-07-16 RX ADMIN — ENOXAPARIN SODIUM 40 MG: 40 INJECTION SUBCUTANEOUS at 21:47

## 2024-07-16 RX ADMIN — ACETAMINOPHEN 975 MG: 325 TABLET, FILM COATED ORAL at 03:30

## 2024-07-16 RX ADMIN — SIMETHICONE 80 MG: 80 TABLET, CHEWABLE ORAL at 08:25

## 2024-07-16 RX ADMIN — GABAPENTIN 100 MG: 100 CAPSULE ORAL at 20:43

## 2024-07-16 RX ADMIN — SIMETHICONE 80 MG: 80 TABLET, CHEWABLE ORAL at 21:48

## 2024-07-16 RX ADMIN — IBUPROFEN 800 MG: 800 TABLET, FILM COATED ORAL at 01:40

## 2024-07-16 RX ADMIN — IBUPROFEN 800 MG: 800 TABLET, FILM COATED ORAL at 08:26

## 2024-07-16 RX ADMIN — ACETAMINOPHEN 975 MG: 325 TABLET, FILM COATED ORAL at 09:42

## 2024-07-16 RX ADMIN — GABAPENTIN 100 MG: 100 CAPSULE ORAL at 08:26

## 2024-07-16 RX ADMIN — IBUPROFEN 800 MG: 800 TABLET, FILM COATED ORAL at 14:40

## 2024-07-16 RX ADMIN — OXYCODONE HYDROCHLORIDE 5 MG: 5 TABLET ORAL at 04:43

## 2024-07-16 RX ADMIN — ACETAMINOPHEN 975 MG: 325 TABLET, FILM COATED ORAL at 16:18

## 2024-07-16 RX ADMIN — IBUPROFEN 800 MG: 800 TABLET, FILM COATED ORAL at 20:44

## 2024-07-16 RX ADMIN — Medication 1 TABLET: at 08:26

## 2024-07-16 RX ADMIN — SENNOSIDES AND DOCUSATE SODIUM 2 TABLET: 50; 8.6 TABLET ORAL at 08:26

## 2024-07-16 ASSESSMENT — ACTIVITIES OF DAILY LIVING (ADL)
ADLS_ACUITY_SCORE: 19
ADLS_ACUITY_SCORE: 18
ADLS_ACUITY_SCORE: 19
ADLS_ACUITY_SCORE: 19
ADLS_ACUITY_SCORE: 18
ADLS_ACUITY_SCORE: 19
ADLS_ACUITY_SCORE: 18
ADLS_ACUITY_SCORE: 19
ADLS_ACUITY_SCORE: 18
ADLS_ACUITY_SCORE: 19
ADLS_ACUITY_SCORE: 18
ADLS_ACUITY_SCORE: 19

## 2024-07-16 NOTE — PROGRESS NOTES
Patient Name:  Sridevi Self   MRN:  [id number]  Age:  28 year old    YOB: 1995      POSTPARTUM/POST-OPERATIVE PROGRESS NOTE    Pt is POD#2 s/p C/S.  She is doing OK this morning. Watts removed, voiding spontaneously. Tolerating PO without nausea/vomiting, passing flatus. Reports pain is OK, meds adjusted and she is less sleepy. Concerned about swelling in her legs.     Objective:    Temp:  [97.9  F (36.6  C)-98  F (36.7  C)] 98  F (36.7  C)  Pulse:  [] 105  Resp:  [16-18] 16  BP: (107-112)/(60-73) 110/60  205 lbs 0 oz      General Appearance:  NAD, A&O x 3, comfortable  Lungs:  unlabored  Cardiovascular:  RRR  Abdomen:  soft, minimally distended; appropriately tender near incision; no rebound or guarding  Fundus:  firm, below the umbilicus  Incision:  clean, dry and intact, covered with skin glue  Lower extremities:  1+ bilateral edema      Lab Review:  ABO/RH(D)   Date Value Ref Range Status   2024 O NEG  Final     Hemoglobin   Date Value Ref Range Status   07/15/2024 10.0 (L) 11.7 - 15.7 g/dL Final   07/15/2024 9.9 (L) 11.7 - 15.7 g/dL Final   2024 12.1 11.7 - 15.7 g/dL Final     Hematocrit   Date Value Ref Range Status   07/15/2024 30.8 (L) 35.0 - 47.0 % Final   07/15/2024 30.0 (L) 35.0 - 47.0 % Final   2024 36.3 35.0 - 47.0 % Final     Assessment:  29yo  POD#2 s/p unscheduled  for cat II FHT and pain intolerance in labor, doing well.      Plan:  - Post-op: recovering well.  Cont PO regular diet.  - Acute on chronic Anemia; ABLA (PPH): Asymptomatic.  Hgb 12.1>9.9.  Continue start PO iron.   - Pain control: Cont PO pain meds (Tylenol, Gabapentin, Ibuprofen) and lidocaine patches  - Bilateral extensions at time of PCS:  Reviewed events of PCS.  Recommended RCS for future pregnancies. She agrees, and declines TOLAC.  She desires 2-3 kids max.   - h/o Fatty Liver: no lab abnormalities antenatally  - Anx: mood stable, no SI/HI.  No meds.    - DVT Ppx:   Lovenox while in hospital.  Encourage ambulation.  - Compression socks and elevation for leg swelling  - Contraception: POPs  - Dispo: anticipate DC POD #3 per patient request    Lauren MacNeill, MD [id number]  [phone]  07/16/2024 7:45 AM

## 2024-07-16 NOTE — PLAN OF CARE
"Pt is bonding well with baby girl. Pt is now breastfeeding with nipple shield, has been pumping and formula feeding. Tolerates eating and drinking.  Fundus is firm, midline and 2 cm below umbilicus. Lochia is scant, rubra and pt denies having any clots. VSS. Voiding and passing gas. Ambulating independently- took some walks in the [place] and showered today. Tolerating incisional pain well with, tylenol,  ibuprofen, ice and using the abd binder.- rating a 2/10.     Goal Outcome Evaluation:      Plan of Care Reviewed With: patient    Overall Patient Progress: improvingOverall Patient Progress: improving    Outcome Evaluation: VSS. Tolerating incisional pain with tylenol, ibuprofen, ice and using the abd binder- rates it a 2-4/10. starting to breastfeed along with pumping with each feed.    Problem: Adult Inpatient Plan of Care  Goal: Plan of Care Review  Description: The Plan of Care Review/Shift note should be completed every shift.  The Outcome Evaluation is a brief statement about your assessment that the patient is improving, declining, or no change.  This information will be displayed automatically on your shift  note.  Outcome: Progressing  Flowsheets (Taken 7/16/2024 1702)  Outcome Evaluation: VSS. Tolerating incisional pain with tylenol, ibuprofen, ice and using the abd binder- rates it a 2-4/10. starting to breastfeed along with pumping with each feed.  Plan of Care Reviewed With: patient  Overall Patient Progress: improving  Goal: Patient-Specific Goal (Individualized)  Description: You can add care plan individualizations to a care plan. Examples of Individualization might be:  \"Parent requests to be called daily at 9am for status\", \"I have a hard time hearing out of my right ear\", or \"Do not touch me to wake me up as it startles  me\".  Outcome: Progressing  Goal: Absence of Hospital-Acquired Illness or Injury  Outcome: Progressing  Intervention: Prevent Skin Injury  Recent Flowsheet Documentation  Taken " 7/16/2024 1501 by Sridevi Zhang RN  Body Position: position changed independently  Taken 7/16/2024 0808 by Sridevi Zhang RN  Body Position: position changed independently  Intervention: Prevent and Manage VTE (Venous Thromboembolism) Risk  Recent Flowsheet Documentation  Taken 7/16/2024 1501 by Sridevi Zhang RN  VTE Prevention/Management: (ambulation and fluids promoted)   other (see comments)   compression stockings on  Taken 7/16/2024 1032 by Sridevi Zhang RN  VTE Prevention/Management: compression stockings on  Taken 7/16/2024 0808 by Sridevi Zhang RN  VTE Prevention/Management: (ambulation and fluids promoted) other (see comments)  Intervention: Prevent Infection  Recent Flowsheet Documentation  Taken 7/16/2024 1501 by Sridevi Zhang RN  Infection Prevention: hand hygiene promoted  Taken 7/16/2024 0808 by Sridevi Zhang RN  Infection Prevention: hand hygiene promoted  Goal: Optimal Comfort and Wellbeing  Outcome: Progressing  Intervention: Monitor Pain and Promote Comfort  Recent Flowsheet Documentation  Taken 7/16/2024 1625 by Sridevi Zhang RN  Pain Management Interventions: medication (see MAR)  Taken 7/16/2024 1100 by Sridevi Zhang RN  Pain Management Interventions: cold applied  Taken 7/16/2024 1043 by Sridevi Zhang RN  Pain Management Interventions: declines  Taken 7/16/2024 0826 by Sridevi Zhang RN  Pain Management Interventions: declines  Taken 7/16/2024 0808 by Sridevi Zhang RN  Pain Management Interventions: medication (see MAR)  Intervention: Provide Person-Centered Care  Recent Flowsheet Documentation  Taken 7/16/2024 1501 by Sridevi Zhang RN  Trust Relationship/Rapport:   care explained   choices provided   emotional support provided   empathic listening provided   questions answered   questions encouraged   reassurance provided   thoughts/feelings acknowledged  Taken 7/16/2024 0808 by Sridevi Zhang RN  Trust  Relationship/Rapport:   care explained   choices provided   emotional support provided   empathic listening provided   questions answered   questions encouraged   reassurance provided   thoughts/feelings acknowledged  Goal: Readiness for Transition of Care  Outcome: Progressing     Problem: Fall Injury Risk  Goal: Absence of Fall and Fall-Related Injury  Outcome: Progressing  Intervention: Identify and Manage Contributors  Recent Flowsheet Documentation  Taken 2024 1501 by Sridevi Zhang RN  Self-Care Promotion: independence encouraged  Taken 2024 0808 by Srdievi Zhang RN  Self-Care Promotion: independence encouraged     Problem: Postpartum ( Delivery)  Goal: Successful Parent Role Transition  Outcome: Progressing  Intervention: Support Parent Role Transition  Recent Flowsheet Documentation  Taken 2024 1501 by Sridevi Zhang RN  Supportive Measures:   active listening utilized   decision-making supported   journaling promoted   self-care encouraged   verbalization of feelings encouraged   self-responsibility promoted   self-reflection promoted  Parent-Child Attachment Promotion:   caring behavior modeled   cue recognition promoted   face-to-face positioning promoted   interaction encouraged   parent/caregiver presence encouraged   participation in care promoted   positive reinforcement provided   rooming-in promoted   skin-to-skin contact encouraged  Taken 2024 0808 by Sridevi Zhang RN  Supportive Measures:   active listening utilized   decision-making supported   journaling promoted   self-care encouraged   verbalization of feelings encouraged   self-responsibility promoted   self-reflection promoted  Parent-Child Attachment Promotion:   caring behavior modeled   cue recognition promoted   face-to-face positioning promoted   interaction encouraged   parent/caregiver presence encouraged   participation in care promoted   positive reinforcement provided   rooming-in  promoted   skin-to-skin contact encouraged  Goal: Hemostasis  Outcome: Progressing  Intervention: Manage Bleeding  Recent Flowsheet Documentation  Taken 7/16/2024 1501 by Sridevi Zhang RN  Syncope Management: position changed slowly  Taken 7/16/2024 0808 by Sridevi Zhang RN  Syncope Management: position changed slowly  Goal: Effective Bowel Elimination  Outcome: Progressing  Intervention: Enhance Bowel Motility and Elimination  Recent Flowsheet Documentation  Taken 7/16/2024 1501 by Sridevi Zhang RN  Bowel Motility Enhancement:   ambulation promoted   fluid intake encouraged  Bowel Elimination Promotion:   adequate fluid intake promoted   ambulation promoted  Taken 7/16/2024 0808 by Sridevi Zhang RN  Bowel Motility Enhancement:   ambulation promoted   fluid intake encouraged  Bowel Elimination Promotion:   adequate fluid intake promoted   ambulation promoted  Goal: Fluid and Electrolyte Balance  Outcome: Progressing  Intervention: Monitor and Manage Fluid and Electrolyte Balance  Recent Flowsheet Documentation  Taken 7/16/2024 1501 by Sridevi Zhang RN  Fluid/Electrolyte Management: fluids provided  Taken 7/16/2024 0808 by Sridevi Zhang RN  Fluid/Electrolyte Management: fluids provided  Goal: Absence of Infection Signs and Symptoms  Outcome: Progressing  Intervention: Prevent or Manage Infection  Recent Flowsheet Documentation  Taken 7/16/2024 1501 by Sridevi Zhang RN  Infection Management: aseptic technique maintained  Taken 7/16/2024 0808 by Sridevi Zhang RN  Infection Management: aseptic technique maintained  Goal: Anesthesia/Sedation Recovery  Outcome: Progressing  Goal: Optimal Pain Control and Function  Outcome: Progressing  Intervention: Prevent or Manage Pain  Recent Flowsheet Documentation  Taken 7/16/2024 1625 by Sridevi Zhang RN  Pain Management Interventions: medication (see MAR)  Taken 7/16/2024 1100 by Sridevi Zhang RN  Pain Management  Interventions: cold applied  Taken 7/16/2024 1043 by Sridevi Zhang RN  Pain Management Interventions: declines  Taken 7/16/2024 0826 by Sridevi Zhang RN  Pain Management Interventions: declines  Taken 7/16/2024 0808 by Sridevi Zhang RN  Pain Management Interventions: medication (see MAR)  Goal: Nausea and Vomiting Relief  Outcome: Progressing  Goal: Effective Urinary Elimination  Outcome: Progressing  Intervention: Monitor and Manage Urinary Retention  Recent Flowsheet Documentation  Taken 7/16/2024 1501 by Sridevi Zhang RN  Urinary Elimination Promotion: frequent voiding encouraged  Taken 7/16/2024 0808 by Sridevi Zhang RN  Urinary Elimination Promotion: frequent voiding encouraged  Goal: Effective Oxygenation and Ventilation  Outcome: Progressing  Intervention: Optimize Oxygenation and Ventilation  Recent Flowsheet Documentation  Taken 7/16/2024 1501 by Sridevi Zhang RN  Head of Bed (HOB) Positioning: HOB at 60 degrees  Taken 7/16/2024 0808 by Sridevi Zhang RN  Head of Bed (HOB) Positioning: HOB at 60 degrees

## 2024-07-16 NOTE — PLAN OF CARE
Goal Outcome Evaluation:      Plan of Care Reviewed With: patient    Overall Patient Progress: improving    Patient vital signs stable and meeting expected outcomes.  Pumping and bottle feeding infant.  Writer fed infant during the night while mother observed and asked questions.  Pain controlled with tylenol, ibuprofen, oxycodone, and abdominal binder.  Up independently and voiding adequately.  Able to perform all cares for self and infant with some assistance from staff.  Bonding well with baby.   present and supportive at bedside.  Will continue to monitor.    Problem: Adult Inpatient Plan of Care  Goal: Plan of Care Review  Description: The Plan of Care Review/Shift note should be completed every shift.  The Outcome Evaluation is a brief statement about your assessment that the patient is improving, declining, or no change.  This information will be displayed automatically on your shift  note.  7/16/2024 0504 by Aneta Casper RN  Outcome: Progressing  Flowsheets (Taken 7/16/2024 0504)  Plan of Care Reviewed With: patient  Overall Patient Progress: improving  7/16/2024 0503 by Aneta Casper RN  Goal: Absence of Hospital-Acquired Illness or Injury  7/16/2024 0504 by Aneta Casper RN  Outcome: Progressing  Intervention: Prevent Skin Injury  Recent Flowsheet Documentation  Taken 7/16/2024 0050 by Aneta Casper RN  Body Position: position changed independently  Goal: Optimal Comfort and Wellbeing  7/16/2024 0504 by Aneta Casper RN  Outcome: Progressing  Intervention: Monitor Pain and Promote Comfort  Recent Flowsheet Documentation  Taken 7/16/2024 0443 by Aneta Casper RN  Pain Management Interventions: (abdominal binder)   medication (see MAR)   other (see comments)  Taken 7/15/2024 2359 by Aneta Casper RN  Pain Management Interventions: medication (see MAR)  Intervention: Provide Person-Centered Care  Recent Flowsheet Documentation  Taken 7/16/2024 0050 by Tremayne  Aneta TAYLOR RN  Trust Relationship/Rapport:   care explained   choices provided   emotional support provided   empathic listening provided   questions answered   questions encouraged   reassurance provided   thoughts/feelings acknowledged  Goal: Readiness for Transition of Care  2024 050 by Aneta Casper RN  Outcome: Progressing  2024 0503 by Aneta Casper RN  Outcome: Progressing     Problem: Fall Injury Risk  Goal: Absence of Fall and Fall-Related Injury  2024 050 by Aneta Casper RN  Outcome: Progressing    Problem: Postpartum ( Delivery)  Goal: Successful Parent Role Transition  2024 050 by Aneta Casper RN  Outcome: Progressing  Goal: Hemostasis  2024 050 by Aneta Casper RN  Outcome: Progressing  Goal: Effective Bowel Elimination  2024 050 by Aneta Casper RN  Outcome: Progressing  Goal: Fluid and Electrolyte Balance  2024 050 by Aneta Casper RN  Outcome: Progressing  Goal: Absence of Infection Signs and Symptoms  2024 050 by Aneta Casper RN  Outcome: Progressing  Goal: Anesthesia/Sedation Recovery  2024 050 by Aneta Casper RN  Outcome: Progressing  Goal: Optimal Pain Control and Function  2024 050 by Aneta Casper RN  Outcome: Progressing  Intervention: Prevent or Manage Pain  Recent Flowsheet Documentation  Taken 2024 0443 by Aneta Casper RN  Pain Management Interventions: (abdominal binder)   medication (see MAR)   other (see comments)  Taken 7/15/2024 2359 by Aneta Casper RN  Pain Management Interventions: medication (see MAR)  Goal: Nausea and Vomiting Relief  2024 050 by Aneta Casper RN  Outcome: Progressing  Goal: Effective Urinary Elimination  2024 050 by Aneta Casper RN  Outcome: Progressing  Goal: Effective Oxygenation and Ventilation  2024 050 by Aneta Casper RN  Outcome: Progressing  Intervention: Optimize  Oxygenation and Ventilation  Recent Flowsheet Documentation  Taken 7/16/2024 0050 by Aneta Casper, RN  Head of Bed (HOB) Positioning: HOB at 60 degrees

## 2024-07-16 NOTE — PLAN OF CARE
"Goal Outcome Evaluation:      Plan of Care Reviewed With: patient, spouse          Outcome Evaluation: Pt is taking Ibuprofen, Tylenol, oxycodone for pain with relief .  Heating pad  applied on abdomen . Lidocaine patch applied above incision  at 2000's.  Pt is up and ambulating on hallway.  Mom is also breastpumping .      Problem: Adult Inpatient Plan of Care  Goal: Plan of Care Review  Description: The Plan of Care Review/Shift note should be completed every shift.  The Outcome Evaluation is a brief statement about your assessment that the patient is improving, declining, or no change.  This information will be displayed automatically on your shift  note.  Outcome: Progressing  Flowsheets (Taken 7/15/2024 2319)  Outcome Evaluation: Pt is taking Ibuprofen, Tylenol, oxycodone for pain with relief .  Heating pad  applied on abdomen . Lidocaine patch applied above incision  at 2000's.  Pt is up and ambulating on hallway.  Mom is also breastpumping .  Plan of Care Reviewed With:   patient   spouse  Goal: Patient-Specific Goal (Individualized)  Description: You can add care plan individualizations to a care plan. Examples of Individualization might be:  \"Parent requests to be called daily at 9am for status\", \"I have a hard time hearing out of my right ear\", or \"Do not touch me to wake me up as it startles  me\".  Outcome: Progressing  Goal: Absence of Hospital-Acquired Illness or Injury  Outcome: Progressing  Intervention: Prevent Skin Injury  Recent Flowsheet Documentation  Taken 7/15/2024 2003 by Maya Lora RN  Body Position: position changed independently  Goal: Optimal Comfort and Wellbeing  Outcome: Progressing  Intervention: Monitor Pain and Promote Comfort  Recent Flowsheet Documentation  Taken 7/15/2024 2003 by Maya Lora RN  Pain Management Interventions: medication (see MAR)  Intervention: Provide Person-Centered Care  Recent Flowsheet Documentation  Taken 7/15/2024 2100 by Maya Lora, " RN  Trust Relationship/Rapport: care explained  Goal: Readiness for Transition of Care  Outcome: Progressing     Problem: Labor  Goal: Hemostasis  Outcome: Progressing  Goal: Stable Fetal Wellbeing  Outcome: Progressing  Intervention: Promote and Monitor Fetal Wellbeing  Recent Flowsheet Documentation  Taken 7/15/2024 2003 by Maya Lora, RN  Body Position: position changed independently  Goal: Effective Progression to Delivery  Outcome: Progressing  Goal: Absence of Infection Signs and Symptoms  Outcome: Progressing  Goal: Acceptable Pain Control  Outcome: Progressing  Goal: Normal Uterine Contraction Pattern  Outcome: Progressing     Problem: Fall Injury Risk  Goal: Absence of Fall and Fall-Related Injury  Outcome: Progressing

## 2024-07-17 VITALS
BODY MASS INDEX: 38.8 KG/M2 | RESPIRATION RATE: 18 BRPM | HEIGHT: 61 IN | SYSTOLIC BLOOD PRESSURE: 127 MMHG | TEMPERATURE: 97.5 F | HEART RATE: 79 BPM | WEIGHT: 205.5 LBS | DIASTOLIC BLOOD PRESSURE: 81 MMHG | OXYGEN SATURATION: 98 %

## 2024-07-17 LAB
PATH REPORT.COMMENTS IMP SPEC: NORMAL
PATH REPORT.COMMENTS IMP SPEC: NORMAL
PATH REPORT.FINAL DX SPEC: NORMAL
PATH REPORT.GROSS SPEC: NORMAL
PATH REPORT.MICROSCOPIC SPEC OTHER STN: NORMAL
PATH REPORT.RELEVANT HX SPEC: NORMAL
PHOTO IMAGE: NORMAL
PLATELET # BLD AUTO: 269 10E3/UL (ref 150–450)

## 2024-07-17 PROCEDURE — 36415 COLL VENOUS BLD VENIPUNCTURE: CPT | Performed by: OBSTETRICS & GYNECOLOGY

## 2024-07-17 PROCEDURE — 250N000013 HC RX MED GY IP 250 OP 250 PS 637: Performed by: OBSTETRICS & GYNECOLOGY

## 2024-07-17 PROCEDURE — 85049 AUTOMATED PLATELET COUNT: CPT | Performed by: OBSTETRICS & GYNECOLOGY

## 2024-07-17 RX ADMIN — ACETAMINOPHEN 975 MG: 325 TABLET, FILM COATED ORAL at 10:50

## 2024-07-17 RX ADMIN — Medication 1 TABLET: at 08:27

## 2024-07-17 RX ADMIN — SENNOSIDES AND DOCUSATE SODIUM 1 TABLET: 50; 8.6 TABLET ORAL at 08:27

## 2024-07-17 RX ADMIN — IBUPROFEN 800 MG: 800 TABLET, FILM COATED ORAL at 08:27

## 2024-07-17 RX ADMIN — ACETAMINOPHEN 975 MG: 325 TABLET, FILM COATED ORAL at 04:57

## 2024-07-17 RX ADMIN — IBUPROFEN 800 MG: 800 TABLET, FILM COATED ORAL at 02:37

## 2024-07-17 RX ADMIN — GABAPENTIN 100 MG: 100 CAPSULE ORAL at 08:27

## 2024-07-17 ASSESSMENT — ACTIVITIES OF DAILY LIVING (ADL)
ADLS_ACUITY_SCORE: 18

## 2024-07-17 NOTE — LACTATION NOTE
This note was copied from a baby's chart.  Lactation visit; primary RN reports infant working with OT and is now bottle feeding better- up to 30 mls with JAYLAN bottle. RN reporting Sridevi would like to work on breastfeed- writer in room to assist however parents report they gave formula instead. Discussed importance of q3 hour pumping to establish supply- [facility] states she hasn't been pumping consistently until today. Writer assisted with pump set up and reinforced pump settings. [facility] states they are getting visitors but would like to practice latching after they leave- writer to stop back in room at 2000 or with infant cues prior to.     Update 2000: Infant sleeping but Sly would like to work on latching. Reviewed using pacifier prior to breastfeed to help organize suck pattern and for tongue exercises. Infant initially tongue thrusting and holding tongue back but did move into a more coordinated suck pattern after about a minute of pacifier use. Reinforced tongue exercises with parents.    Assisted with bringing STS to left breast in football hold- infant tongue thrusting with nipple shield and getting fussy. Writer assisted with finger feeding 1 ml syringe EBM to infant and then used formula under shield and infant re latched. With palate stimulation and formula under shield infant started few rhythmic sucks- took 1 ml formula under shield and then refused to suck and sleeping at breast. Attempted to re wake but infant refusing to latch again. FOB will bottle feed while Sly pumps. Sly reporting that is first time infant has latched and had suck pattern at breast- praise provided and discussed benefits of practice latching. All questions answered and primary RN updated.

## 2024-07-17 NOTE — CONSULTS
"D) SWS responding to automatic referral r/t MOB scoring 12 on the EPDS.   I) SWS met with MOB, Sly & Sergo WINTER who is supportive & involved. This their first child together and they are prepared for baby at home.  Sly shared she has been very emotional since her \"traumatic delivery.\" She voiced she had an unplanned C Section & lost a lot of blood afterwards. Sly also shared she is already worrying about returning to work in twelve weeks and having to separate from her baby. She shared she is feeling guilty about sleeping through baby's cries at night as she is a heavy sleeper. Sly shared she is tate that her partner is very supportive & involved. They also have multiple family members & friends who live within 5 minutes of them that plan to help as needed at home. Sly shared she does not take any medication to assist with her mood but has used an online therapist through her work in the past. SW provided supportive listening & praise for the job she is doing. SWS discussed baby blues/postpartum depression and gave information on this. SWS reviewed crisis lines, support groups, & [facility] [place] Support [place]'s help line.  Sly denied needing any referrals or additional information at this time.   A) Sly is A&O with good affect and eye contact. She is bonding well with baby. Extended family is supportive & involved.   P) No further d/c needs at this time. SWS available upon request.  Alysa Payne, [facility]  7/17/2024  2:01 PM      "

## 2024-07-17 NOTE — PROVIDER NOTIFICATION
"   07/17/24 1308   Provider Notification   Provider Name/Title Dr. Hobson   Method of Notification Phone   Request Evaluate-Remote   Notification Reason Status Update  (Writer updated MD that patient scored a 12 on PPD screen; no, to self harm. SW seeing patient prior to discharge.)     Patient feels she has good support going home, but says it was just an \"emotional week.\" SW order placed.  "

## 2024-07-17 NOTE — PLAN OF CARE
Vital signs stable. Postpartum assessment WDL. Uterine fundus is firm and midline. Scant vaginal bleeding. Using Tylenol and Ibuprofen for pain with good relief, lidocaine patches in place. Incision assessment WDL , inter dry in place Up and ambulating; free of dizziness. Voiding w/o difficulty. Tolerating a regular diet. Pumping every 3 hours while baby bottle feeds. Baby brought to breast once during lactation visit. Bonding well with  and independent with cares. Questions/concerns addressed.  Expected discharge today.    Problem: Adult Inpatient Plan of Care  Goal: Plan of Care Review  Description: The Plan of Care Review/Shift note should be completed every shift.  The Outcome Evaluation is a brief statement about your assessment that the patient is improving, declining, or no change.  This information will be displayed automatically on your shift  note.  Outcome: Progressing  Flowsheets (Taken 2024)  Plan of Care Reviewed With: patient  Overall Patient Progress: improving  Goal: Absence of Hospital-Acquired Illness or Injury  Outcome: Progressing  Intervention: Prevent Skin Injury  Recent Flowsheet Documentation  Taken 2024 by Elise Hughes RN  Body Position: position changed independently  Taken 2024 by Elise Hughes RN  Body Position: position changed independently  Intervention: Prevent and Manage VTE (Venous Thromboembolism) Risk  Recent Flowsheet Documentation  Taken 2024 by Elise Hughes RN  VTE Prevention/Management: compression stockings on  Taken 2024 by Elise Hughes RN  VTE Prevention/Management: compression stockings on  Intervention: Prevent Infection  Recent Flowsheet Documentation  Taken 2024 by Elise Hughes RN  Infection Prevention:   hand hygiene promoted   rest/sleep promoted   single patient room provided  Taken 2024 by Elise Hughes RN  Infection Prevention:   hand hygiene promoted    rest/sleep promoted   single patient room provided  Goal: Optimal Comfort and Wellbeing  Outcome: Progressing  Intervention: Provide Person-Centered Care  Recent Flowsheet Documentation  Taken 2024 by Elise Hughes RN  Trust Relationship/Rapport:   care explained   choices provided   questions answered   questions encouraged  Taken 2024 by Elise Hughes RN  Trust Relationship/Rapport:   care explained   choices provided   questions answered   questions encouraged  Goal: Readiness for Transition of Care  Outcome: Progressing     Problem: Fall Injury Risk  Goal: Absence of Fall and Fall-Related Injury  Outcome: Progressing  Intervention: Identify and Manage Contributors  Recent Flowsheet Documentation  Taken 2024 by Elise Hughes RN  Self-Care Promotion: independence encouraged     Problem: Postpartum ( Delivery)  Goal: Successful Parent Role Transition  Outcome: Progressing  Intervention: Support Parent Role Transition  Recent Flowsheet Documentation  Taken 2024 045 by Elise Hughes RN  Supportive Measures:   active listening utilized   decision-making supported   journaling promoted   self-care encouraged   verbalization of feelings encouraged   self-responsibility promoted   self-reflection promoted  Parent-Child Attachment Promotion:   caring behavior modeled   cue recognition promoted   face-to-face positioning promoted   interaction encouraged   parent/caregiver presence encouraged   participation in care promoted   positive reinforcement provided   rooming-in promoted   skin-to-skin contact encouraged  Taken 2024 by Elise Hughes RN  Supportive Measures:   active listening utilized   decision-making supported   journaling promoted   self-care encouraged   verbalization of feelings encouraged   self-responsibility promoted   self-reflection promoted  Parent-Child Attachment Promotion:   caring behavior modeled   cue recognition promoted    face-to-face positioning promoted   interaction encouraged   parent/caregiver presence encouraged   participation in care promoted   positive reinforcement provided   rooming-in promoted   skin-to-skin contact encouraged  Goal: Hemostasis  Outcome: Progressing  Goal: Effective Bowel Elimination  Outcome: Progressing  Intervention: Enhance Bowel Motility and Elimination  Recent Flowsheet Documentation  Taken 7/17/2024 0456 by Elise Hughes RN  Bowel Motility Enhancement:   ambulation promoted   fluid intake encouraged  Bowel Elimination Promotion:   adequate fluid intake promoted   ambulation promoted  Taken 7/16/2024 2038 by Elise Hughes RN  Bowel Motility Enhancement:   ambulation promoted   fluid intake encouraged  Bowel Elimination Promotion:   adequate fluid intake promoted   ambulation promoted  Goal: Fluid and Electrolyte Balance  Outcome: Progressing  Goal: Absence of Infection Signs and Symptoms  Outcome: Progressing  Goal: Anesthesia/Sedation Recovery  Outcome: Progressing  Goal: Optimal Pain Control and Function  Outcome: Progressing  Goal: Nausea and Vomiting Relief  Outcome: Progressing  Goal: Effective Urinary Elimination  Outcome: Progressing  Goal: Effective Oxygenation and Ventilation  Outcome: Progressing  Intervention: Optimize Oxygenation and Ventilation  Recent Flowsheet Documentation  Taken 7/17/2024 0456 by Elise Hughes, RN  Head of Bed (HOB) Positioning: HOB at 30 degrees  Taken 7/16/2024 2038 by Elise Hughes RN  Head of Bed (HOB) Positioning: HOB at 60 degrees   Goal Outcome Evaluation:      Plan of Care Reviewed With: patient    Overall Patient Progress: improvingOverall Patient Progress: improving

## 2024-07-17 NOTE — PLAN OF CARE
"Goal Outcome Evaluation:      Plan of Care Reviewed With: patient    Overall Patient Progress: improvingOverall Patient Progress: improving    Outcome Evaluation: VSS. Pain managed. Incision YUNIOR.    Patient meeting expected goals. Is up independent in room, meeting all personal and infant needs. VSS. Denies preeclamptic sx.. Pain (incisional) is being managed with Tylenol, Ibuprofen, Neurontin and Lidocaine patches. Voiding with out difficulty. Incision to low abdomen is YUNIOR, well approximated, is without drainage and no signs of infection noted to surrounding tissue. Currently feeding infant formula using Mamm bottle provided by OT. Infant tolerating well.         Problem: Adult Inpatient Plan of Care  Goal: Plan of Care Review  Description: The Plan of Care Review/Shift note should be completed every shift.  The Outcome Evaluation is a brief statement about your assessment that the patient is improving, declining, or no change.  This information will be displayed automatically on your shift  note.  Outcome: Progressing  Flowsheets (Taken 7/17/2024 0849)  Outcome Evaluation: VSS. Pain managed. Incision YUNIOR.  Plan of Care Reviewed With: patient  Overall Patient Progress: improving  Goal: Patient-Specific Goal (Individualized)  Description: You can add care plan individualizations to a care plan. Examples of Individualization might be:  \"Parent requests to be called daily at 9am for status\", \"I have a hard time hearing out of my right ear\", or \"Do not touch me to wake me up as it startles  me\".  Outcome: Progressing  Goal: Absence of Hospital-Acquired Illness or Injury  Outcome: Progressing  Intervention: Prevent Skin Injury  Recent Flowsheet Documentation  Taken 7/17/2024 0800 by Marlen Treadwell, RN  Body Position: position changed independently  Intervention: Prevent and Manage VTE (Venous Thromboembolism) Risk  Recent Flowsheet Documentation  Taken 7/17/2024 0800 by Marlen Treadwell, RN  VTE " Prevention/Management:   compression stockings on   SCDs off (sequential compression devices)  Intervention: Prevent Infection  Recent Flowsheet Documentation  Taken 2024 08 by Marlen Treadwell RN  Infection Prevention:   rest/sleep promoted   hand hygiene promoted  Goal: Optimal Comfort and Wellbeing  Outcome: Progressing  Intervention: Monitor Pain and Promote Comfort  Recent Flowsheet Documentation  Taken 2024 0824 by Marlen Treadwell RN  Pain Management Interventions:   medication (see MAR)   pain management plan reviewed with patient/caregiver  Intervention: Provide Person-Centered Care  Recent Flowsheet Documentation  Taken 2024 by Marlen Treadwell RN  Trust Relationship/Rapport:   care explained   choices provided   emotional support provided   empathic listening provided   questions answered   questions encouraged   reassurance provided   thoughts/feelings acknowledged  Goal: Readiness for Transition of Care  Outcome: Progressing     Problem: Fall Injury Risk  Goal: Absence of Fall and Fall-Related Injury  Outcome: Progressing  Intervention: Identify and Manage Contributors  Recent Flowsheet Documentation  Taken 2024 by Marlen Treadwell RN  Self-Care Promotion: independence encouraged     Problem: Postpartum ( Delivery)  Goal: Successful Parent Role Transition  Outcome: Progressing  Intervention: Support Parent Role Transition  Recent Flowsheet Documentation  Taken 2024 by Marlen Treadwell RN  Supportive Measures:   active listening utilized   goal-setting facilitated   positive reinforcement provided   self-care encouraged   self-responsibility promoted  Parent-Child Attachment Promotion:   caring behavior modeled   interaction encouraged   strengths emphasized   positive reinforcement provided   participation in care promoted   parent/caregiver presence encouraged   cue recognition promoted  Goal: Hemostasis  Outcome:  Progressing  Goal: Effective Bowel Elimination  Outcome: Progressing  Intervention: Enhance Bowel Motility and Elimination  Recent Flowsheet Documentation  Taken 7/17/2024 0800 by Marlen Treadwell RN  Bowel Motility Enhancement: ambulation promoted  Bowel Elimination Promotion:   adequate fluid intake promoted   ambulation promoted  Goal: Fluid and Electrolyte Balance  Outcome: Progressing  Goal: Absence of Infection Signs and Symptoms  Outcome: Progressing  Goal: Anesthesia/Sedation Recovery  Outcome: Progressing  Goal: Optimal Pain Control and Function  Outcome: Progressing  Intervention: Prevent or Manage Pain  Recent Flowsheet Documentation  Taken 7/17/2024 0824 by Marlen Treadwell RN  Pain Management Interventions:   medication (see MAR)   pain management plan reviewed with patient/caregiver  Taken 7/17/2024 0800 by Marlen Treadwell RN  Perineal Care: absorbent brief/pad changed  Goal: Nausea and Vomiting Relief  Outcome: Progressing  Goal: Effective Urinary Elimination  Outcome: Progressing  Intervention: Monitor and Manage Urinary Retention  Recent Flowsheet Documentation  Taken 7/17/2024 0800 by Marlen Treadwell RN  Urinary Elimination Promotion: frequent voiding encouraged  Goal: Effective Oxygenation and Ventilation  Outcome: Progressing  Intervention: Optimize Oxygenation and Ventilation  Recent Flowsheet Documentation  Taken 7/17/2024 0800 by Marlen Treadwell RN  Head of Bed (HOB) Positioning: HOB at 60-90 degrees

## 2024-07-17 NOTE — PLAN OF CARE
"Goal Outcome Evaluation:      Plan of Care Reviewed With: patient    Overall Patient Progress: improvingOverall Patient Progress: improving    Outcome Evaluation: VSS. Pain managed. Incision YUNIOR.    All discharge instructions were reviewed with patient by writer and all questions answered. Patient is aware to follow up in 2 weeks for incision check and in 6 weeks for postpartum visit OR sooner for any concerns or Mood concerns. SW to see prior to discharge re; PPD 12, with answer of no to thoughts of self harm. SW will provide additional resources.  All discharge medications were reviewed with patient by writer and discussed how/when to take as well as what to do with leftover Oxycodone; verbalized understanding. Patient will leave unit shortly with infant and all belongings.     Problem: Adult Inpatient Plan of Care  Goal: Plan of Care Review  Description: The Plan of Care Review/Shift note should be completed every shift.  The Outcome Evaluation is a brief statement about your assessment that the patient is improving, declining, or no change.  This information will be displayed automatically on your shift  note.  7/17/2024 1321 by Marlen Treadwell RN  Outcome: Met  7/17/2024 0849 by Marlen Treadwell RN  Outcome: Progressing  Flowsheets (Taken 7/17/2024 0849)  Outcome Evaluation: VSS. Pain managed. Incision YUNIOR.  Plan of Care Reviewed With: patient  Overall Patient Progress: improving  Goal: Patient-Specific Goal (Individualized)  Description: You can add care plan individualizations to a care plan. Examples of Individualization might be:  \"Parent requests to be called daily at 9am for status\", \"I have a hard time hearing out of my right ear\", or \"Do not touch me to wake me up as it startles  me\".  7/17/2024 1321 by Marlen Treadwell, RN  Outcome: Met  7/17/2024 0849 by Marlen Treawdell RN  Outcome: Progressing  Goal: Absence of Hospital-Acquired Illness or Injury  7/17/2024 1321 by " Marlen Treadwell RN  Outcome: Met  7/17/2024 0849 by Marlen Treadwell RN  Outcome: Progressing  Intervention: Prevent Skin Injury  Recent Flowsheet Documentation  Taken 7/17/2024 0800 by Marlen Treadwell RN  Body Position: position changed independently  Intervention: Prevent and Manage VTE (Venous Thromboembolism) Risk  Recent Flowsheet Documentation  Taken 7/17/2024 0800 by Marlen Treadwell RN  VTE Prevention/Management:   compression stockings on   SCDs off (sequential compression devices)  Intervention: Prevent Infection  Recent Flowsheet Documentation  Taken 7/17/2024 0800 by Marlen Treadwell RN  Infection Prevention:   rest/sleep promoted   hand hygiene promoted  Goal: Optimal Comfort and Wellbeing  7/17/2024 1321 by Marlen Treadwell RN  Outcome: Met  7/17/2024 0849 by Marlen Treadwell RN  Outcome: Progressing  Intervention: Monitor Pain and Promote Comfort  Recent Flowsheet Documentation  Taken 7/17/2024 0824 by Marlen Treadwell RN  Pain Management Interventions:   medication (see MAR)   pain management plan reviewed with patient/caregiver  Intervention: Provide Person-Centered Care  Recent Flowsheet Documentation  Taken 7/17/2024 0800 by Marlen Treadwell RN  Trust Relationship/Rapport:   care explained   choices provided   emotional support provided   empathic listening provided   questions answered   questions encouraged   reassurance provided   thoughts/feelings acknowledged  Goal: Readiness for Transition of Care  7/17/2024 1321 by Marlen Treadwell RN  Outcome: Met  7/17/2024 0849 by Marlen Treadwell RN  Outcome: Progressing     Problem: Fall Injury Risk  Goal: Absence of Fall and Fall-Related Injury  7/17/2024 1321 by Marlen Treadwell RN  Outcome: Met  7/17/2024 0849 by Marlen Treadwell RN  Outcome: Progressing  Intervention: Identify and Manage Contributors  Recent Flowsheet Documentation  Taken  2024 0800 by Marlen Treadwell RN  Self-Care Promotion: independence encouraged     Problem: Postpartum ( Delivery)  Goal: Successful Parent Role Transition  2024 1321 by Marlen Treadwell RN  Outcome: Met  2024 0849 by Marlen Treadwell RN  Outcome: Progressing  Intervention: Support Parent Role Transition  Recent Flowsheet Documentation  Taken 2024 0800 by Marlen Treadwell RN  Supportive Measures:   active listening utilized   goal-setting facilitated   positive reinforcement provided   self-care encouraged   self-responsibility promoted  Parent-Child Attachment Promotion:   caring behavior modeled   interaction encouraged   strengths emphasized   positive reinforcement provided   participation in care promoted   parent/caregiver presence encouraged   cue recognition promoted  Goal: Hemostasis  2024 1321 by Marlen Treadwell RN  Outcome: Met  2024 0849 by Marlen Treadwell RN  Outcome: Progressing  Goal: Effective Bowel Elimination  2024 1321 by Marlen Treadwell RN  Outcome: Met  2024 0849 by Marlen Treadwell RN  Outcome: Progressing  Intervention: Enhance Bowel Motility and Elimination  Recent Flowsheet Documentation  Taken 2024 0800 by Marlen Treadwell RN  Bowel Motility Enhancement: ambulation promoted  Bowel Elimination Promotion:   adequate fluid intake promoted   ambulation promoted  Goal: Fluid and Electrolyte Balance  2024 1321 by Marlen Treadwell RN  Outcome: Met  2024 0849 by Marlen Treadwell RN  Outcome: Progressing  Goal: Absence of Infection Signs and Symptoms  2024 1321 by Marlen Treadwell RN  Outcome: Met  2024 0849 by Marlen Treadwell RN  Outcome: Progressing  Goal: Anesthesia/Sedation Recovery  2024 1321 by Marlen Treadwell RN  Outcome: Met  2024 0849 by Marlen Treadwell RN  Outcome: Progressing  Goal:  Optimal Pain Control and Function  7/17/2024 1321 by Marlen Treadwell RN  Outcome: Met  7/17/2024 0849 by Marlen Treadwell RN  Outcome: Progressing  Intervention: Prevent or Manage Pain  Recent Flowsheet Documentation  Taken 7/17/2024 0824 by Marlen Treadwell RN  Pain Management Interventions:   medication (see MAR)   pain management plan reviewed with patient/caregiver  Taken 7/17/2024 0800 by Marlen Treadwell RN  Perineal Care: absorbent brief/pad changed  Goal: Nausea and Vomiting Relief  7/17/2024 1321 by Marlen Treadwell RN  Outcome: Met  7/17/2024 0849 by Marlen Treadwell RN  Outcome: Progressing  Goal: Effective Urinary Elimination  7/17/2024 1321 by Marlen Treadwell RN  Outcome: Met  7/17/2024 0849 by Marlen Treadwell RN  Outcome: Progressing  Intervention: Monitor and Manage Urinary Retention  Recent Flowsheet Documentation  Taken 7/17/2024 0800 by Marlen Treadwell RN  Urinary Elimination Promotion: frequent voiding encouraged  Goal: Effective Oxygenation and Ventilation  7/17/2024 1321 by Marlen Treadwell RN  Outcome: Met  7/17/2024 0849 by Marlen Treadwell RN  Outcome: Progressing  Intervention: Optimize Oxygenation and Ventilation  Recent Flowsheet Documentation  Taken 7/17/2024 0800 by Marlen Treadwell RN  Head of Bed (HOB) Positioning: HOB at 60-90 degrees

## 2024-07-17 NOTE — PROGRESS NOTES
[facility] Obstetrics Post-Op / Progress Note    Interval History   Doing well.  Pain is well-controlled.  No fevers.  No history of wound drainage, warmth or significant erythema.  Good appetite.  Denies chest pain, shortness of breath, nausea or vomiting.  Ambulatory.  Breastfeeding well.    Medications   Current Facility-Administered Medications   Medication Dose Route Frequency Provider Last Rate Last Admin    dextrose 5% in lactated ringers infusion   Intravenous Continuous Debbie Sultana  mL/hr at 07/14/24 1323 New Bag at 07/14/24 1323    No MMR Needed -  Assessment: Patient does not need MMR vaccine   Does not apply Continuous PRN Debbie Sultana MD        No Tdap Needed - Assessment: Patient does not need Tdap vaccine   Does not apply Continuous PRN Debbie Sultana MD        oxytocin (PITOCIN) 30 units in 500 mL 0.9% NaCl infusion  340 mL/hr Intravenous Continuous PRN Debbie Sultana MD         Current Facility-Administered Medications   Medication Dose Route Frequency Provider Last Rate Last Admin    acetaminophen (TYLENOL) tablet 975 mg  975 mg Oral Q6H RdDebbie shea MD   975 mg at 07/17/24 0457    Elemental iron 65 mg Vitamin C 125 mg (VITRON C) tablet 1 tablet  1 tablet Oral Daily Susi Kraus MD   1 tablet at 07/16/24 0826    enoxaparin ANTICOAGULANT (LOVENOX) injection 40 mg  40 mg Subcutaneous Q24H Debbie Sultana MD   40 mg at 07/16/24 2147    gabapentin (NEURONTIN) capsule 100 mg  100 mg Oral BID Debbie Sultana MD   100 mg at 07/16/24 2043    ibuprofen (ADVIL/MOTRIN) tablet 800 mg  800 mg Oral Q6H Debbie Sultana MD   800 mg at 07/17/24 0237    Lidocaine (LIDOCARE) 4 % Patch 2 patch  2 patch Transdermal Q24h Debbie Sultana MD   2 patch at 07/16/24 2044    senna-docusate (SENOKOT-S/PERICOLACE) 8.6-50 MG per tablet 1 tablet  1 tablet Oral BID Debbie Sultana MD   1 tablet at 07/15/24 0828    Or     senna-docusate (SENOKOT-S/PERICOLACE) 8.6-50 MG per tablet 2 tablet  2 tablet Oral BID Debbie Sultana MD   2 tablet at 24       Physical Exam   Temp: 97.7  F (36.5  C) Temp src: Oral BP: 131/83 Pulse: 93   Resp: 18   O2 Device: None (Room air)    Vitals:    24 2220   Weight: 93 kg (205 lb)     Vital Signs with Ranges  Temp:  [97.4  F (36.3  C)-98.3  F (36.8  C)] 97.7  F (36.5  C)  Pulse:  [] 93  Resp:  [16-18] 18  BP: (116-131)/(62-86) 131/83  No intake/output data recorded.    Uterine fundus is firm, non-tender and at the level of the umbilicus  Incision C/D/I    Data   Recent Labs   Lab Test 24  2302   AS Negative     Recent Labs   Lab Test 07/15/24  1820 07/15/24  0612   HGB 10.0* 9.9*     No lab results found.    Assessment:  27yo  POD#3 s/p unscheduled  for cat II FHT and pain intolerance in labor, doing well.       Plan:  - Post-op: recovering well.  Cont PO regular diet.  - Acute on chronic Anemia; ABLA (PPH): Asymptomatic.  Hgb 12.1>9.9.  Continue start PO iron.   - Pain control: Cont PO pain meds (Tylenol, Gabapentin, Ibuprofen) and lidocaine patches  - Bilateral extensions at time of PCS:  Reviewed events of PCS.  Recommended RCS for future pregnancies. She agrees, and declines TOLAC.  She desires 2-3 kids max.   - h/o Fatty Liver: no lab abnormalities antenatally  - Anx: mood stable, no SI/HI.  No meds.    - DVT Ppx:  Lovenox while in hospital.  Encourage ambulation.  - Compression socks and elevation for leg swelling  - Contraception: POPs  - Dispo: anticipate DC today    Pt Rh neg, baby Rh pos--per RN, has had Rhogam    Christal Hobson MD

## 2024-07-17 NOTE — DISCHARGE INSTRUCTIONS
Follow up in 2 weeks for Incision check, and in 6 weeks for Postpartum check; OR sooner for any concerns.   Review PP depression screen weekly or every few days to see if there are any mood concerns; call you OB.    Warning Signs after Having a Baby    Keep this paper on your fridge or somewhere else where you can see it.    Call your provider if you have any of these symptoms up to 12 weeks after having your baby.    Thoughts of hurting yourself or your baby  Pain in your chest or trouble breathing  Severe headache not helped by pain medicine  Eyesight concerns (blurry vision, seeing spots or flashes of light, other changes to eyesight)  Fainting, shaking or other signs of a seizure    Call 9-1-1 if you feel that it is an emergency.     The symptoms below can happen to anyone after giving birth. They can be very serious. Call your provider if you have any of these warning signs.    My provider s phone number: _______________________    Losing too much blood (hemorrhage)    Call your provider if you soak through a pad in less than an hour or pass blood clots bigger than a golf ball. These may be signs that you are bleeding too much.    Blood clots in the legs or lungs    After you give birth, your body naturally clots its blood to help prevent blood loss. Sometimes this increased clotting can happen in other areas of the body, like the legs or lungs. This can block your blood flow and be very dangerous.     Call your provider if you:  Have a red, swollen spot on the back of your leg that is warm or painful when you touch it.   Are coughing up blood.     Infection    Call your provider if you have any of these symptoms:  Fever of 100.4 F (38 C) or higher.  Pain or redness around your stitches if you had an incision.   Any yellow, white, or green fluid coming from places where you had stitches or surgery.    Mood Problems (postpartum depression)    Many people feel sad or have mood changes after having a baby. But for  some people, these mood swings are worse.     Call your provider right away if you feel so anxious or nervous that you can't care for yourself or your baby.    Preeclampsia (high blood pressure)    Even if you didn't have high blood pressure when you were pregnant, you are at risk for the high blood pressure disease called preeclampsia. This risk can last up to 12 weeks after giving birth.     Call your provider if you have:   Pain on your right side under your rib cage  Sudden swelling in the hands and face    Remember: You know your body. If something doesn't feel right, get medical help.     For informational purposes only. Not to replace the advice of your health care provider. Copyright 2020 [facility]. All rights reserved. Clinically reviewed by Laine Morejon, RNC-OB, MSN. [employer] 101415 - Rev .     Section: What to Expect at Home  Your Recovery     A  section, or , is surgery to deliver your baby through a cut that the doctor makes in your lower belly and uterus. The cut is called an incision.  You may have some pain in your lower belly and need pain medicine for 1 to 2 weeks. You can expect some vaginal bleeding for several weeks. You will probably need about 6 weeks to fully recover.  It's important to take it easy while the incision heals. Avoid heavy lifting, strenuous activities, and exercises that strain the belly muscles while you recover. Ask a family member or friend for help with housework, cooking, and shopping.  This care sheet gives you a general idea about how long it will take for you to recover. But each person recovers at a different pace. Follow the steps below to get better as quickly as possible.  How can you care for yourself at home?  Activity    Rest when you feel tired. Getting enough sleep will help you recover.     Try to walk each day. Start by walking a little more than you did the day before. Bit by bit, increase the amount you  walk. Walking boosts blood flow and helps prevent pneumonia, constipation, and blood clots.     Avoid strenuous activities, such as bicycle riding, jogging, weightlifting, and aerobic exercise, for 6 weeks or until your doctor says it is okay.     Until your doctor says it is okay, do not lift anything heavier than your baby.     Do not do sit-ups or other exercises that strain the belly muscles for 6 weeks or until your doctor says it is okay.     Hold a pillow over your incision when you cough or take deep breaths. This will support your belly and decrease your pain.     You may shower as usual. Pat the incision dry when you are done.     You will have some vaginal bleeding. Wear sanitary pads. Do not douche or use tampons until your doctor says it is okay.     Ask your doctor when you can drive again.     You will probably need to take at least 6 weeks off work. It depends on the type of work you do and how you feel.     Ask your doctor when it is okay for you to have sex.   Diet    You can eat your normal diet. If your stomach is upset, try bland, low-fat foods like plain rice, broiled chicken, toast, and yogurt.     Drink plenty of fluids (unless your doctor tells you not to).     You may notice that your bowel movements are not regular right after your surgery. This is common. Try to avoid constipation and straining with bowel movements. You may want to take a fiber supplement every day. If you have not had a bowel movement after a couple of days, ask your doctor about taking a mild laxative.     If you are breastfeeding, limit alcohol. Alcohol can cause a lack of energy and other health problems for the baby when a breastfeeding woman drinks heavily. It can also get in the way of a mom's ability to feed her baby or to care for the child in other ways. There isn't a lot of research about exactly how much alcohol can harm a baby. Having no alcohol is the safest choice for your baby. If you choose to have a  drink now and then, have only one drink, and limit the number of occasions that you have a drink. Wait to breastfeed at least 2 hours after you have a drink to reduce the amount of alcohol the baby may get in the milk.   Medicines    Your doctor will tell you if and when you can restart your medicines. You will also get instructions about taking any new medicines.     If you stopped taking aspirin or some other blood thinner, your doctor will tell you when to start taking it again.     Take pain medicines exactly as directed.  If the doctor gave you a prescription medicine for pain, take it as prescribed.  If you are not taking a prescription pain medicine, ask your doctor if you can take an over-the-counter medicine.     If you think your pain medicine is making you sick to your stomach:  Take your medicine after meals (unless your doctor has told you not to).  Ask your doctor for a different pain medicine.     If your doctor prescribed antibiotics, take them as directed. Do not stop taking them just because you feel better. You need to take the full course of antibiotics.   Incision care    If you have strips of tape on the incision, leave the tape on for a week or until it falls off.     Wash the area daily with warm, soapy water, and pat it dry. Don't use hydrogen peroxide or alcohol, which can slow healing. You may cover the area with a gauze bandage if it weeps or rubs against clothing. Change the bandage every day.     Keep the area clean and dry.   Other instructions    If you breastfeed your baby, you may be more comfortable while you are healing if you don't rest your baby on your belly. Try tucking your baby under your arm, with your baby's body along the side you will be feeding on. Support your baby's upper body with your arm. With that hand you can control your baby's head to bring your baby's mouth to your breast. This is sometimes called the football hold.   Follow-up care is a key part of your  treatment and safety. Be sure to make and go to all appointments, and call your doctor if you are having problems. It's also a good idea to know your test results and keep a list of the medicines you take.  When should you call for help?  Share this information with your partner, family, or a friend. They can help you watch for warning signs.  Call 911  anytime you think you may need emergency care. For example, call if:    You feel you cannot stop from hurting yourself, your baby, or someone else.     You passed out (lost consciousness).     You have chest pain, are short of breath, or cough up blood.     You have a seizure.   Where to get help 24 hours a day, 7 days a week   If you or someone you know talks about suicide, self-harm, a mental health crisis, a substance use crisis, or any other kind of emotional distress, get help right away. You can:    Call the Suicide and Crisis Lifeline at 483.     Call 8-083-023-TALK (1-793.737.9756).     Text HOME to 033625 to access the Crisis Text Line.   Consider saving these numbers in your phone.  Go to [employer] for more information or to chat online.  Call your doctor or midwife now or seek immediate medical care if:    You have loose stitches, or your incision comes open.     You have signs of hemorrhage (too much bleeding), such as:  Heavy vaginal bleeding. This means that you are soaking through one or more pads in an hour. Or you pass blood clots bigger than an egg.  Feeling dizzy or lightheaded, or you feel like you may faint.  Feeling so tired or weak that you cannot do your usual activities.  A fast or irregular heartbeat.  New or worse belly pain.     You have symptoms of infection, such as:  Increased pain, swelling, warmth, or redness.  Red streaks leading from the incision.  Pus draining from the incision.  A fever.  Frequent or painful urination or blood in your urine.  Vaginal discharge that smells bad.  New or worse belly pain.     You have symptoms  "of a blood clot in your leg (called a deep vein thrombosis), such as:  Pain in the calf, back of the knee, thigh, or groin.  Swelling in the leg or groin.  A color change on the leg or groin. The skin may be reddish or purplish, depending on your usual skin color.     You have signs of preeclampsia, such as:  Sudden swelling of your face, hands, or feet.  New vision problems (such as dimness, blurring, or seeing spots).  A severe headache.     You have signs of heart failure, such as:  New or increased shortness of breath.  New or worse swelling in your legs, ankles, or feet.  Sudden weight gain, such as more than 2 to 3 pounds in a day or 5 pounds in a week.  Feeling so tired or weak that you cannot do your usual activities.     You had spinal or epidural pain relief and have:  New or worse back pain.  Increased pain, swelling, warmth, or redness at the injection site.  Tingling, weakness, or numbness in your legs or groin.   Watch closely for changes in your health, and be sure to contact your doctor or midwife if:    Your vaginal bleeding isn't decreasing.     You feel sad, anxious, or hopeless for more than a few days.     You are having problems with your breasts or breastfeeding.   Where can you learn more?  Go to https://[email or web address].com.net/patiented  Enter M806 in the search box to learn more about \" Section: What to Expect at Home.\"  Current as of: July 10, 2023               Content Version: 14.0    5285-2480 [employer].   Care instructions adapted under license by your healthcare professional. If you have questions about a medical condition or this instruction, always ask your healthcare professional. [employer] disclaims any warranty or liability for your use of this information.        "

## 2024-07-18 ENCOUNTER — PATIENT OUTREACH (OUTPATIENT)
Dept: CARE COORDINATION | Facility: CLINIC | Age: 29
End: 2024-07-18
Payer: COMMERCIAL

## 2025-05-18 ENCOUNTER — HEALTH MAINTENANCE LETTER (OUTPATIENT)
Age: 30
End: 2025-05-18

## (undated) DEVICE — BAG CLEAR TRASH 1.3M 39X33" P4040C

## (undated) DEVICE — SURGICEL POWDER ABSORBABLE HEMOSTAT 3GM 3013SP

## (undated) DEVICE — SU VICRYL 0 CT-1 27" J340H

## (undated) DEVICE — SU VICRYL 3-0 KS 27" UND J663H

## (undated) DEVICE — LINEN DRAPE 54X72" 5467

## (undated) DEVICE — GLOVE BIOGEL PI MICRO SZ 7.0 48570

## (undated) DEVICE — BLADE CLIPPER SGL USE 9680

## (undated) DEVICE — DRSG ABDOMINAL 07 1/2X8" 7197D

## (undated) DEVICE — LINEN TOWEL PACK X10 5473

## (undated) DEVICE — STOCKING SLEEVE VASOPRESS COMPRESSION CALF MED VP501M

## (undated) DEVICE — SOL NACL 0.9% IRRIG 1000ML BOTTLE 2F7124

## (undated) DEVICE — SU VICRYL 0 CT-1 36" J346H

## (undated) DEVICE — SU PLAIN 3-0 CT 27" 852H

## (undated) DEVICE — ESU GROUND PAD ADULT W/CORD E7507

## (undated) DEVICE — PREP CHLORAPREP 26ML TINTED HI-LITE ORANGE 930815

## (undated) DEVICE — CATH TRAY FOLEY 16FR SILICONE 907416

## (undated) DEVICE — GLOVE BIOGEL PI MICRO INDICATOR UNDERGLOVE SZ 7.0 48970

## (undated) DEVICE — Device

## (undated) DEVICE — PACK C-SECTION LF PL15OTA83B

## (undated) DEVICE — SU MONOCRYL 0 CT-1 36" UND Y946H

## (undated) DEVICE — DRSG STERI STRIP 1/2X4" R1547

## (undated) DEVICE — LINEN HALF SHEET 5512

## (undated) DEVICE — SPONGE LAP 18X18" X8435

## (undated) DEVICE — LINEN FULL SHEET 5511

## (undated) DEVICE — SYR TRANSFER DEVICE BLOOD NDL HOLDER 3648800

## (undated) DEVICE — SOL WATER IRRIG 1000ML BOTTLE 2F7114

## (undated) DEVICE — RETR VISCERA FISH MED 3204

## (undated) DEVICE — SU PDS II 0 CTX 60" Z990G

## (undated) DEVICE — CAP BABY PINK/BLUE IC-2

## (undated) RX ORDER — OXYTOCIN/0.9 % SODIUM CHLORIDE 30/500 ML
PLASTIC BAG, INJECTION (ML) INTRAVENOUS
Status: DISPENSED
Start: 2024-07-14

## (undated) RX ORDER — KETOROLAC TROMETHAMINE 15 MG/ML
INJECTION, SOLUTION INTRAMUSCULAR; INTRAVENOUS
Status: DISPENSED
Start: 2024-01-08

## (undated) RX ORDER — ONDANSETRON 2 MG/ML
INJECTION INTRAMUSCULAR; INTRAVENOUS
Status: DISPENSED
Start: 2024-07-14

## (undated) RX ORDER — DEXAMETHASONE SODIUM PHOSPHATE 4 MG/ML
INJECTION, SOLUTION INTRA-ARTICULAR; INTRALESIONAL; INTRAMUSCULAR; INTRAVENOUS; SOFT TISSUE
Status: DISPENSED
Start: 2024-07-14

## (undated) RX ORDER — MORPHINE SULFATE 1 MG/ML
INJECTION, SOLUTION EPIDURAL; INTRATHECAL; INTRAVENOUS
Status: DISPENSED
Start: 2024-07-14

## (undated) RX ORDER — FENTANYL CITRATE-0.9 % NACL/PF 10 MCG/ML
PLASTIC BAG, INJECTION (ML) INTRAVENOUS
Status: DISPENSED
Start: 2024-07-14

## (undated) RX ORDER — KETOROLAC TROMETHAMINE 30 MG/ML
INJECTION, SOLUTION INTRAMUSCULAR; INTRAVENOUS
Status: DISPENSED
Start: 2024-07-14

## (undated) RX ORDER — TRANEXAMIC ACID 100 MG/ML
INJECTION, SOLUTION INTRAVENOUS
Status: DISPENSED
Start: 2024-07-14

## (undated) RX ORDER — FENTANYL CITRATE 50 UG/ML
INJECTION, SOLUTION INTRAMUSCULAR; INTRAVENOUS
Status: DISPENSED
Start: 2024-07-14